# Patient Record
Sex: MALE | Race: BLACK OR AFRICAN AMERICAN | Employment: OTHER | URBAN - METROPOLITAN AREA
[De-identification: names, ages, dates, MRNs, and addresses within clinical notes are randomized per-mention and may not be internally consistent; named-entity substitution may affect disease eponyms.]

---

## 2017-01-19 ENCOUNTER — HOSPITAL ENCOUNTER (OUTPATIENT)
Dept: CT IMAGING | Age: 82
Discharge: HOME | End: 2017-01-19
Attending: INTERNAL MEDICINE
Payer: MEDICARE

## 2017-01-19 DIAGNOSIS — R93.89 ABNORMAL CT SCAN: ICD-10-CM

## 2017-01-19 PROCEDURE — 71250 CT THORAX DX C-: CPT

## 2017-04-25 ENCOUNTER — OFFICE VISIT (OUTPATIENT)
Dept: INTERNAL MEDICINE CLINIC | Age: 82
End: 2017-04-25

## 2017-04-25 ENCOUNTER — TELEPHONE (OUTPATIENT)
Dept: INTERNAL MEDICINE CLINIC | Age: 82
End: 2017-04-25

## 2017-04-25 ENCOUNTER — HOSPITAL ENCOUNTER (OUTPATIENT)
Dept: GENERAL RADIOLOGY | Age: 82
Discharge: HOME | End: 2017-04-25
Payer: MEDICARE

## 2017-04-25 VITALS
WEIGHT: 149.13 LBS | HEIGHT: 69 IN | TEMPERATURE: 98.1 F | OXYGEN SATURATION: 95 % | HEART RATE: 74 BPM | RESPIRATION RATE: 18 BRPM | BODY MASS INDEX: 22.09 KG/M2 | DIASTOLIC BLOOD PRESSURE: 75 MMHG | SYSTOLIC BLOOD PRESSURE: 160 MMHG

## 2017-04-25 DIAGNOSIS — Z13.39 SCREENING FOR ALCOHOLISM: ICD-10-CM

## 2017-04-25 DIAGNOSIS — R50.9 FEVER AND CHILLS: ICD-10-CM

## 2017-04-25 DIAGNOSIS — I45.2 BIFASCICULAR BLOCK: ICD-10-CM

## 2017-04-25 DIAGNOSIS — J20.9 ACUTE BRONCHITIS, UNSPECIFIED ORGANISM: ICD-10-CM

## 2017-04-25 DIAGNOSIS — I45.10 RBBB: ICD-10-CM

## 2017-04-25 DIAGNOSIS — Z00.00 MEDICARE ANNUAL WELLNESS VISIT, SUBSEQUENT: Primary | ICD-10-CM

## 2017-04-25 DIAGNOSIS — R42 DIZZY SPELLS: ICD-10-CM

## 2017-04-25 DIAGNOSIS — J41.8 MIXED SIMPLE AND MUCOPURULENT CHRONIC BRONCHITIS (HCC): ICD-10-CM

## 2017-04-25 DIAGNOSIS — R05.8 PRODUCTIVE COUGH: ICD-10-CM

## 2017-04-25 DIAGNOSIS — Z71.89 ADVANCED CARE PLANNING/COUNSELING DISCUSSION: ICD-10-CM

## 2017-04-25 DIAGNOSIS — J30.89 ALLERGIC RHINITIS DUE TO OTHER ALLERGIC TRIGGER, UNSPECIFIED RHINITIS SEASONALITY: ICD-10-CM

## 2017-04-25 DIAGNOSIS — N40.0 BENIGN PROSTATIC HYPERPLASIA, PRESENCE OF LOWER URINARY TRACT SYMPTOMS UNSPECIFIED, UNSPECIFIED MORPHOLOGY: ICD-10-CM

## 2017-04-25 DIAGNOSIS — Z13.31 SCREENING FOR DEPRESSION: ICD-10-CM

## 2017-04-25 PROCEDURE — 71020 XR CHEST PA LAT: CPT

## 2017-04-25 RX ORDER — FINASTERIDE 5 MG/1
5 TABLET, FILM COATED ORAL DAILY
Qty: 30 TAB | Refills: 5 | Status: SHIPPED | OUTPATIENT
Start: 2017-04-25 | End: 2017-10-15 | Stop reason: SDUPTHER

## 2017-04-25 RX ORDER — MOMETASONE FUROATE 50 UG/1
2 SPRAY, METERED NASAL DAILY
Qty: 1 CONTAINER | Refills: 5 | Status: SHIPPED | OUTPATIENT
Start: 2017-04-25

## 2017-04-25 RX ORDER — AMOXICILLIN AND CLAVULANATE POTASSIUM 875; 125 MG/1; MG/1
1 TABLET, FILM COATED ORAL 2 TIMES DAILY
Qty: 20 TAB | Refills: 0 | Status: SHIPPED | OUTPATIENT
Start: 2017-04-25 | End: 2017-05-17 | Stop reason: ALTCHOICE

## 2017-04-25 RX ORDER — IPRATROPIUM BROMIDE AND ALBUTEROL SULFATE 2.5; .5 MG/3ML; MG/3ML
3 SOLUTION RESPIRATORY (INHALATION)
COMMUNITY

## 2017-04-25 RX ORDER — ALBUTEROL SULFATE 0.83 MG/ML
2.5 SOLUTION RESPIRATORY (INHALATION)
Qty: 100 EACH | Refills: 5 | Status: SHIPPED | OUTPATIENT
Start: 2017-04-25 | End: 2017-10-23 | Stop reason: SDUPTHER

## 2017-04-25 RX ORDER — ALBUTEROL SULFATE 90 UG/1
AEROSOL, METERED RESPIRATORY (INHALATION)
Qty: 1 INHALER | Refills: 5 | Status: SHIPPED | OUTPATIENT
Start: 2017-04-25

## 2017-04-25 NOTE — MR AVS SNAPSHOT
Visit Information Date & Time Provider Department Dept. Phone Encounter #  
 4/25/2017  2:30 PM Graciela Cuevas MD [facility] [address] [id number] Follow-up Instructions Return in about 6 days (around 5/1/2017). Upcoming Health Maintenance Date Due DTaP/Tdap/Td series (1 - Tdap) 8/10/1958 ZOSTER VACCINE AGE 60> 8/10/1997 GLAUCOMA SCREENING Q2Y 8/10/2002 MEDICARE YEARLY EXAM 8/10/2002 Pneumococcal 65+ Low/Medium Risk (2 of 2 - PPSV23) 6/17/2018 Allergies as of 4/25/2017  Review Complete On: 4/25/2017 By: Jens Russo LPN Severity Noted Reaction Type Reactions Codeine  01/05/2013    Nausea and Vomiting Current Immunizations  Reviewed on 6/17/2015 Name Date Influenza High Dose Vaccine PF 10/1/2016 Pneumococcal Vaccine (Unspecified Type) 6/17/2013 Not reviewed this visit You Were Diagnosed With   
  
 Codes Comments Medicare annual wellness visit, subsequent    -  Primary ICD-10-CM: Z00.00 ICD-9-CM: V70.0 Mixed simple and mucopurulent chronic bronchitis (HCC)     ICD-10-CM: J41.8 ICD-9-CM: 491.1 Productive cough     ICD-10-CM: R05 ICD-9-CM: 786.2 Fever and chills     ICD-10-CM: R50.9 ICD-9-CM: 780.60 Dizzy spells     ICD-10-CM: K33 ICD-9-CM: 780.4 RBBB     ICD-10-CM: I45.10 ICD-9-CM: 426.4 Bifascicular block     ICD-10-CM: I45.2 ICD-9-CM: 426.53 Acute bronchitis, unspecified organism     ICD-10-CM: J20.9 ICD-9-CM: 466.0 Benign prostatic hyperplasia, presence of lower urinary tract symptoms unspecified, unspecified morphology     ICD-10-CM: N40.0 ICD-9-CM: 600.00 Vitals BP Pulse Temp Resp Height(growth percentile) Weight(growth percentile) 160/75 (BP 1 Location: Left arm, BP Patient Position: Sitting) 74 98.1 °F (36.7 °C) (Oral) 18 5' 9\" (1.753 m) 149 lb 2 oz (67.6 kg) SpO2 BMI Smoking Status 95% 22.02 kg/m2 Former Smoker Vitals History BMI and BSA Data Body Mass Index Body Surface Area 22.02 kg/m 2 1.81 m 2 Preferred Pharmacy Pharmacy Name Phone [id number] [address] , [address] [place] [address]  [phone] Your Updated Medication List  
  
   
This list is accurate as of: 4/25/17  4:39 PM.  Always use your most recent med list.  
  
  
  
  
 * albuterol 2.5 mg /3 mL (0.083 %) nebulizer solution Commonly known as:  PROVENTIL VENTOLIN  
3 mL by Nebulization route every four (4) hours as needed. Indications: Chronic Obstructive Pulmonary Disease * albuterol 90 mcg/actuation inhaler Commonly known as:  VENTOLIN HFA INHALE 2 PUFFS BY MOUTH EVERY 6 HOURS AS NEEDED FOR WHEEZING  
  
 albuterol-ipratropium 2.5 mg-0.5 mg/3 ml Nebu Commonly known as:  DUO-NEB  
3 mL by Nebulization route. alfuzosin SR 10 mg SR tablet Commonly known as:  Cassidy Pardon Take 10 mg by mouth daily. ALLERGY PO Take  by mouth. amoxicillin-clavulanate 875-125 mg per tablet Commonly known as:  AUGMENTIN Take 1 Tab by mouth two (2) times a day. DULERA 200-5 mcg/actuation HFA inhaler Generic drug:  mometasone-formoterol Take 2 Puffs by inhalation two (2) times a day. finasteride 5 mg tablet Commonly known as:  PROSCAR Take 1 Tab by mouth daily. guaiFENesin 1,200 mg Ta12 ER tablet Commonly known as:  [employer]hu [employer] Madhu Take  by mouth two (2) times a day. Iron 325 mg (65 mg iron) tablet Generic drug:  ferrous sulfate Take  by mouth Daily (before breakfast). mometasone 50 mcg/actuation nasal spray Commonly known as:  NASONEX  
2 Sprays by Both Nostrils route daily. predniSONE 5 mg tablet Commonly known as:  [place] Take 5 mg by mouth daily. PROTONIX 40 mg tablet Generic drug:  pantoprazole Take 40 mg by mouth daily. raNITIdine 300 mg tablet Commonly known as:  ZANTAC Take 300 mg by mouth two (2) times a day. TUDORZA PRESSAIR 400 mcg/actuation inhaler Generic drug:  aclidinium bromide Take 1 Puff by inhalation. * Notice: This list has 2 medication(s) that are the same as other medications prescribed for you. Read the directions carefully, and ask your doctor or other care provider to review them with you. Prescriptions Sent to Pharmacy Refills  
 amoxicillin-clavulanate (AUGMENTIN) 875-125 mg per tablet 0 Sig: Take 1 Tab by mouth two (2) times a day. Class: Normal  
 [id number] [address] Ph #: [phone] Route: Oral  
 finasteride (PROSCAR) 5 mg tablet 5 Sig: Take 1 Tab by mouth daily. Class: Normal  
 [id number] [address] Ph #: [phone] Route: Oral  
 mometasone (NASONEX) 50 mcg/actuation nasal spray 5 Si Sprays by Both Nostrils route daily. Class: Normal  
 [id number] [address] Ph #: [phone] Route: Both Nostrils  
 albuterol (PROVENTIL VENTOLIN) 2.5 mg /3 mL (0.083 %) nebulizer solution 5 Sig: 3 mL by Nebulization route every four (4) hours as needed. Indications: Chronic Obstructive Pulmonary Disease Class: Normal  
 [id number] [address] Ph #: [phone] Route: Nebulization  
 albuterol (VENTOLIN HFA) 90 mcg/actuation inhaler 5 Sig: INHALE 2 PUFFS BY MOUTH EVERY 6 HOURS AS NEEDED FOR WHEEZING Class: Normal  
 [id number] [address] Ph #: [phone] We Performed the Following AMB POC EKG ROUTINE W/ 12 LEADS, INTER & REP [14655 CPT(R)] CBC WITH AUTOMATED DIFF [87478 CPT(R)] METABOLIC PANEL, COMPREHENSIVE [76857 CPT(R)] Follow-up Instructions Return in about 6 days (around 2017). To-Do List   
 2017 Imaging:  XR CHEST PA LAT   
  
 2017 ECG: CARDIAC HOLTER MONITOR, 24 HOURS Introducing [facility]! Briana Pam introduces Str[employer] patient portal. Now you can access parts of your medical record, email your doctor's office, and request medication refills online. 1. In your internet browser, go to [email or web address] 2. Click on the First Time User? Click Here link in the Sign In box. You will see the New Member Sign Up page. 3. Enter your Strut Access Code exactly as it appears below. You will not need to use this code after youve completed the sign-up process. If you do not sign up before the expiration date, you must request a new code. · Strut Access Code: [email or web address] Expires: 7/24/2017  3:20 PM 
 
4. Enter the last four digits of your Social Security Number (xxxx) and Date of Birth (mm/dd/yyyy) as indicated and click Submit. You will be taken to the next sign-up page. 5. Create a Strut ID. This will be your Strut login ID and cannot be changed, so think of one that is secure and easy to remember. 6. Create a Strut password. You can change your password at any time. 7. Enter your Password Reset Question and Answer. This can be used at a later time if you forget your password. 8. Enter your e-mail address. You will receive e-mail notification when new information is available in [address] 9. Click Sign Up. You can now view and download portions of your medical record. 10. Click the Download Summary menu link to download a portable copy of your medical information. If you have questions, please visit the Frequently Asked Questions section of the Strut website. Remember, Strut is NOT to be used for urgent needs. For medical emergencies, dial 911. Now available from your iPhone and Android! Please provide this summary of care documentation to your next provider. Your primary care clinician is listed as Desirae Samaniego.  If you have any questions after today's visit, please call [phone].

## 2017-04-25 NOTE — PROGRESS NOTES
HISTORY OF PRESENT ILLNESS  Fabiano Haji is a 80 y.o. male. HPI  Upper respiratory illness:  Fabiano Haji presents with complaints of congestion, sore throat, night sweats, cough described as productive of brown sputum and hot and cold spells for 3 weeks. no nausea and no vomiting . he has not had  myalgias. Symptoms are moderate. Over-the-counter remedies including prednisone   has been used with poor relief of symptoms. Drinking plenty of fluids: no  Asthma?:  Yes - copd  former smoker, quit  years ago  Contacts with similar infections: no     He reports episodic lightheadedness without syncope. No palpitations or associated chest pain. Chronic dyspnea on exertion perhaps worse with above URI symptoms. No LE edema. Patient Active Problem List   Diagnosis Code    [facility] J[facility].9    BPH (benign prostatic hyperplasia) N40.0    History of lung abscess Z87.09    Bronchiectasis ([facility] J47.9     Past Medical History:   Diagnosis Date    Asthma     Chronic bronchitis (HCC)     Chronic obstructive pulmonary disease (HCC)     Diabetes (HCC)     no meds    GERD (gastroesophageal reflux disease)      Allergies   Allergen Reactions    Codeine Nausea and Vomiting     Current Outpatient Prescriptions on File Prior to Visit   Medication Sig Dispense Refill    predniSONE (DELTASONE) 5 mg tablet Take 5 mg by mouth daily.  guaiFENesin (MUCINEX) 1,200 mg Ta12 ER tablet Take  by mouth two (2) times a day.  0    albuterol (VENTOLIN HFA) 90 mcg/actuation inhaler INHALE 2 PUFFS BY MOUTH EVERY 6 HOURS AS NEEDED FOR WHEEZING 1 Inhaler 5    ferrous sulfate (IRON) 325 mg (65 mg iron) tablet Take  by mouth Daily (before breakfast).  ranitidine (ZANTAC) 300 mg tablet Take 300 mg by mouth two (2) times a day.  albuterol (PROVENTIL VENTOLIN) 2.5 mg /3 mL (0.083 %) nebulizer solution by Nebulization route as needed.       alfuzosin SR (UROXATRAL) 10 mg SR tablet Take 10 mg by mouth daily.  finasteride (PROSCAR) 5 mg tablet Take 5 mg by mouth daily.  NASONEX 50 mcg/actuation nasal spray 2 Sprays by Both Nostrils route daily.  mometasone-formoterol (DULERA) 200-5 mcg/actuation HFA inhaler Take 2 Puffs by inhalation two (2) times a day.  pantoprazole (PROTONIX) 40 mg tablet Take 40 mg by mouth daily.  aclidinium bromide (TUDORZA PRESSAIR) 400 mcg/actuation inhaler Take 1 Puff by inhalation. No current facility-administered medications on file prior to visit. Social History   Substance Use Topics    Smoking status: Former Smoker     Packs/day: 1.50     Years: 40.00     Quit date: 5/16/1979    Smokeless tobacco: None    Alcohol use No           ROS    Physical Exam   Constitutional: He appears well-developed and well-nourished. No distress. /75 (BP 1 Location: Left arm, BP Patient Position: Sitting)  Pulse 74  Temp 98.1 °F (36.7 °C) (Oral)   Resp 18  Ht 5' 9\" (1.753 m)  Wt 149 lb 2 oz (67.6 kg)  SpO2 95% Comment: 2 liters oxygen  BMI 22.02 kg/m2Body mass index is 22.02 kg/(m^2). HENT:   Nose: Mucosal edema and rhinorrhea present. Mouth/Throat: Oropharynx is clear and moist. No oropharyngeal exudate, posterior oropharyngeal edema or posterior oropharyngeal erythema. Neck: No JVD present. Carotid bruit is not present. Cardiovascular: Normal rate, regular rhythm, normal heart sounds and intact distal pulses. Pulmonary/Chest: Effort normal. No accessory muscle usage. No respiratory distress. He has decreased breath sounds. He has wheezes. He has no rhonchi. He has no rales. Musculoskeletal: He exhibits no edema. Neurological: He is alert. Skin: Skin is warm and dry. He is not diaphoretic. Nursing note and vitals reviewed. EKG: RBBB, PAC's noted, bifasicular block. O/w sinus.   EXAM: XR CHEST PA LAT.     INDICATION: Severe cough and fever with mixed simple and mucopurulent chronic  bronchitis.     COMPARISON: 1/5/2013.     FINDINGS:   PA and lateral radiographs of the chest were obtained. The patient is on nasal  oxygen. Lungs: There are surgical clips and staples in the right hilum with volume loss  of the right hemithorax and pleural and parenchymal scarring which is unchanged. The left lung is clear. Pleura: There is no pleural effusion or pneumothorax. Mediastinum: The cardiac and mediastinal contours and pulmonary vascularity are  normal.  Bones and soft tissues: The bones and soft tissues are within normal limits.     IMPRESSION  IMPRESSION: No significant change. ASSESSMENT and PLAN  Dona Edmondson was seen today for annual wellness visit. Diagnoses and all orders for this visit:    Medicare annual wellness visit, subsequent  [employer] received a complete medicare wellness assessment today by Hesham Eddy RN. I've reviewed her assessment note and all screening/preventative plans addressed. I also addressed all questions and concerns surrounding the assessment and plans with [employer]. Mixed simple and mucopurulent chronic bronchitis (HCC)  -     XR CHEST PA LAT; Future  -     albuterol (PROVENTIL VENTOLIN) 2.5 mg /3 mL (0.083 %) nebulizer solution; 3 mL by Nebulization route every four (4) hours as needed. Indications: Chronic Obstructive Pulmonary Disease  -     albuterol (VENTOLIN HFA) 90 mcg/actuation inhaler; INHALE 2 PUFFS BY MOUTH EVERY 6 HOURS AS NEEDED FOR WHEEZING    Productive cough  -     XR CHEST PA LAT; Future    Fever and chills  -     XR CHEST PA LAT; Future    Dizzy spells - BP elevated today. bifasicular block on EKG, ? Episodic complete block or other kenia arrhythmia? Check 24 hour holter. To ER if symptoms progress. Check basic labs  -     AMB POC EKG ROUTINE W/ 12 LEADS, INTER & REP  -     CBC WITH AUTOMATED DIFF  -     METABOLIC PANEL, COMPREHENSIVE  -     CARDIAC HOLTER MONITOR, 24 HOURS; Future  Close follow up.   Consider cardiology consultation based on results of above. RBBB  -     CARDIAC HOLTER MONITOR, 24 HOURS; Future    Bifascicular block  -     CARDIAC HOLTER MONITOR, 24 HOURS; Future    Acute bronchitis, unspecified organism -acite on chronic with negative chest X-ray  Will start antibx. Close follow up.  -     amoxicillin-clavulanate (AUGMENTIN) 875-125 mg per tablet; Take 1 Tab by mouth two (2) times a day. Benign prostatic hyperplasia, presence of lower urinary tract symptoms unspecified, unspecified morphology  -     finasteride (PROSCAR) 5 mg tablet; Take 1 Tab by mouth daily. Allergic rhinitis due to other allergic trigger, unspecified rhinitis seasonality  -     mometasone (NASONEX) 50 mcg/actuation nasal spray; 2 Sprays by Both Nostrils route daily. Other orders        Follow-up Disposition:  Return in about 6 days (around 5/1/2017).

## 2017-04-25 NOTE — TELEPHONE ENCOUNTER
Informed patient that his Cardiology appointment for a holter monitor placed on 4/27/2017 with him arriving at 9:30 am at [address] [facility] [address]. He was given to phone number if he needs to reschedule.

## 2017-04-26 LAB
ALBUMIN SERPL-MCNC: 4.5 G/DL (ref 3.5–4.8)
ALBUMIN/GLOB SERPL: 1.4 {RATIO} (ref 1.2–2.2)
ALP SERPL-CCNC: 75 IU/L (ref 39–117)
ALT SERPL-CCNC: 14 IU/L (ref 0–44)
AST SERPL-CCNC: 19 IU/L (ref 0–40)
BASOPHILS # BLD AUTO: 0 X10E3/UL (ref 0–0.2)
BASOPHILS NFR BLD AUTO: 0 %
BILIRUB SERPL-MCNC: 0.4 MG/DL (ref 0–1.2)
BUN SERPL-MCNC: 12 MG/DL (ref 8–27)
BUN/CREAT SERPL: 11 (ref 10–24)
CALCIUM SERPL-MCNC: 9.5 MG/DL (ref 8.6–10.2)
CHLORIDE SERPL-SCNC: 96 MMOL/L (ref 96–106)
CO2 SERPL-SCNC: 25 MMOL/L (ref 18–29)
CREAT SERPL-MCNC: 1.06 MG/DL (ref 0.76–1.27)
EOSINOPHIL # BLD AUTO: 0 X10E3/UL (ref 0–0.4)
EOSINOPHIL NFR BLD AUTO: 0 %
ERYTHROCYTE [DISTWIDTH] IN BLOOD BY AUTOMATED COUNT: 14 % (ref 12.3–15.4)
GLOBULIN SER CALC-MCNC: 3.3 G/DL (ref 1.5–4.5)
GLUCOSE SERPL-MCNC: 67 MG/DL (ref 65–99)
HCT VFR BLD AUTO: 36.3 % (ref 37.5–51)
HGB BLD-MCNC: 11.7 G/DL (ref 12.6–17.7)
IMM GRANULOCYTES # BLD: 0 X10E3/UL (ref 0–0.1)
IMM GRANULOCYTES NFR BLD: 0 %
LYMPHOCYTES # BLD AUTO: 0.6 X10E3/UL (ref 0.7–3.1)
LYMPHOCYTES NFR BLD AUTO: 9 %
MCH RBC QN AUTO: 28.5 PG (ref 26.6–33)
MCHC RBC AUTO-ENTMCNC: 32.2 G/DL (ref 31.5–35.7)
MCV RBC AUTO: 89 FL (ref 79–97)
MONOCYTES # BLD AUTO: 0.2 X10E3/UL (ref 0.1–0.9)
MONOCYTES NFR BLD AUTO: 3 %
NEUTROPHILS # BLD AUTO: 5.5 X10E3/UL (ref 1.4–7)
NEUTROPHILS NFR BLD AUTO: 88 %
PLATELET # BLD AUTO: 232 X10E3/UL (ref 150–379)
POTASSIUM SERPL-SCNC: 4.9 MMOL/L (ref 3.5–5.2)
PROT SERPL-MCNC: 7.8 G/DL (ref 6–8.5)
RBC # BLD AUTO: 4.1 X10E6/UL (ref 4.14–5.8)
SODIUM SERPL-SCNC: 142 MMOL/L (ref 134–144)
WBC # BLD AUTO: 6.3 X10E3/UL (ref 3.4–10.8)

## 2017-04-26 NOTE — PROGRESS NOTES
Nurse Navigator Medicare Wellness Visit performed by VIANEY Maldonado RN    This is a Subsequent LaurenRjei Visit providing Personalized Prevention Plan Services (PPPS) (Performed 12 months after initial AWV and PPPS )    I have reviewed the patient's medical history in detail and updated the computerized patient record. History     Past Medical History:   Diagnosis Date    Asthma     Chronic bronchitis (HCC)     Chronic obstructive pulmonary disease (HCC)     Diabetes ([address]     no meds    GERD (gastroesophageal reflux disease)       Past Surgical History:   Procedure Laterality Date    CHEST SURGERY PROCEDURE UNLISTED      R upper lobe resection? due to absess    HX CHOLECYSTECTOMY       Current Outpatient Prescriptions   Medication Sig Dispense Refill    albuterol-ipratropium (DUO-NEB) 2.5 mg-0.5 mg/3 ml nebu 3 mL by Nebulization route.  DIPHENHYDRAMINE HCL (ALLERGY PO) Take  by mouth.  amoxicillin-clavulanate (AUGMENTIN) 875-125 mg per tablet Take 1 Tab by mouth two (2) times a day. 20 Tab 0    guaiFENesin (MUCINEX) 1,200 mg Ta12 ER tablet Take  by mouth two (2) times a day.  0    finasteride (PROSCAR) 5 mg tablet Take 1 Tab by mouth daily. 30 Tab 5    mometasone (NASONEX) 50 mcg/actuation nasal spray 2 Sprays by Both Nostrils route daily. 1 Container 5    albuterol (PROVENTIL VENTOLIN) 2.5 mg /3 mL (0.083 %) nebulizer solution 3 mL by Nebulization route every four (4) hours as needed. Indications: Chronic Obstructive Pulmonary Disease 100 Each 5    albuterol (VENTOLIN HFA) 90 mcg/actuation inhaler INHALE 2 PUFFS BY MOUTH EVERY 6 HOURS AS NEEDED FOR WHEEZING 1 Inhaler 5    predniSONE (DELTASONE) 5 mg tablet Take 5 mg by mouth daily.  ferrous sulfate (IRON) 325 mg (65 mg iron) tablet Take  by mouth Daily (before breakfast).  ranitidine (ZANTAC) 300 mg tablet Take 300 mg by mouth two (2) times a day.       alfuzosin SR (UROXATRAL) 10 mg SR tablet Take 10 mg by mouth daily.      mometasone-formoterol (DULERA) 200-5 mcg/actuation HFA inhaler Take 2 Puffs by inhalation two (2) times a day.  pantoprazole (PROTONIX) 40 mg tablet Take 40 mg by mouth daily.  aclidinium bromide (TUDORZA PRESSAIR) 400 mcg/actuation inhaler Take 1 Puff by inhalation. Allergies   Allergen Reactions    Codeine Nausea and Vomiting     Family History   Problem Relation Age of Onset    Heart Disease Father      Social History   Substance Use Topics    Smoking status: Former Smoker     Packs/day: 1.50     Years: 40.00     Quit date: 5/16/1979    Smokeless tobacco: Not on file    Alcohol use No     Patient Active Problem List   Diagnosis Code    [facility] J44.9    BPH (benign prostatic hyperplasia) N40.0    History of lung abscess Z87.09    Bronchiectasis ([facility] J47.9    Advanced care planning/counseling discussion Z71.89       Depression Risk Factor Screening:   Patient denies feelings of being down, depressed or hopeless at this time. Patient states that they have a strong support system within their family & friends. PHQ 2 / 9, over the last two weeks 4/25/2017   Little interest or pleasure in doing things Not at all   Feeling down, depressed or hopeless Not at all   Total Score PHQ 2 0     Alcohol Risk Factor Screening: On any occasion during the past 3 months, have you had more than 4 drinks containing alcohol? No    Do you average more than 14 drinks per week? No      Functional Ability and Level of Safety:     Hearing Loss   normal-to-mild    Activities of Daily Living   Partial assistance. Patient states that he lives in a private residence with his brother. Patient states that he attempts to be independent in all ADL's, but he does not drive. Patient's sister & brother provide assistance & transportation for the patient as needed.  Patient is oxygen dependent due to COPD; patient wears continuous oxygen at 2L/min via nasal cannula. Patient has a portable oxygen tank & a home concentrator. Patient denies use of assistive devices for ambulation. Patient states that he enjoys walking for exercise, but when he is feeling sick, like he is now with a cough & cold, he becomes easily short of breath & dizzy when walking. Patient denies increasing oxygen level with exertion. Patient states that he will discuss this in more detail with PCP today. Patient states that he always ambulates slowly & cautiously. PCP notified. Requires assistance with:   ADL Assessment 4/25/2017   Feeding yourself No Help Needed   Getting from bed to chair No Help Needed   Getting dressed No Help Needed   Bathing or showering No Help Needed   Walk across the room (includes cane/walker) No Help Needed   Using the telphone No Help Needed   Taking your medications No Help Needed   Preparing meals No Help Needed   Managing money (expenses/bills) Help Needed   Moderately strenuous housework (laundry) Help Needed   Shopping for personal items (toiletries/medicines) Help Needed   Shopping for groceries Help Needed   Driving Help Needed   Climbing a flight of stairs No Help Needed   Getting to places beyond walking distances Help Needed       Fall Risk   Patient denies falls within the past year & verbalizes awareness of fall prevention strategies. Fall Risk Assessment, last 12 mths 4/25/2017   Able to walk? Yes   Fall in past 12 months? No     Abuse Screen   Patient is not abused    Review of Systems   Medicare Wellness Visit    Physical Examination     Evaluation of Cognitive Function:  Mood/affect:  Neutral & pleasant  Appearance: age appropriate and casually dressed  Family member/caregiver input: None present; however, patient reports a strong support system. No exam performed today, Medicare Wellness Visit.     Patient Care Team:  Cline Angelucci, MD as PCP - [facility])    Advice/Referrals/Counseling   Education and counseling provided:  End-of-Life planning (with patient's consent)  Pneumococcal Vaccine  Influenza Vaccine  Prostate cancer screening tests (PSA, covered annually)  Colorectal cancer screening tests  Screening for glaucoma  tdap & shingles vaccinations      Assessment/Plan   1. Patient states conversation about Advanced Medical Directive has been started, but nothing written down yet. NN encouraged patient to complete Advanced Medical Directive paperwork & bring a copy to the office for scanning into the medical record. Patient verbalized understanding & agreement. 2. Patient is up to date on the following immunizations: flu vaccine (admin 10/2016), tdap vaccine (admin 5/2013). Patient confirmed the aforementioned preventative immunization dates are correct. Patient reports receiving a pneumonia 23 vaccine several years ago, but he is unable to recall the administration dates. NN encouraged patient to check home records & if information obtained, to please notify PCP's office with the details. Patient verbalized agreement. Patient denies receiving a shingles vaccine in the past & patient denies ever having a case of shingles in the past. Patient is due for Prevnar 13. Patient is aware that Prevnar 13 can be given at their local pharmacy with a prescription from their PCP. Patient verbalized understanding. PCP notified. Patient requests to wait on the prevnar 13 vaccine until he feels better. Patient's health maintenance immunization record has been updated & is current. 3. Due to the patient's age, screening PSA's & screening colonoscopies are no longer indicated unless recommended by PCP or a specialist.    4. Patient was not wearing corrective lenses. Patient confirms that his last routine eye exam & glaucoma screening were completed 12/2015 performed by Dr. Shey Mills at the [facility]. A copy of the eye exam report is on file in the patient's medical record.  Patient verbalized awareness that he is due for an eye exam & he plans to make an eye appointment within the next few months. Patient verbalized understanding of all information discussed. Patient was given the opportunity to ask questions. Medication reconciliation completed by MA/ LPN and reviewed by PCP. Patient provided AVS which includes Medicare Wellness Preventative Screening Table.

## 2017-04-26 NOTE — PATIENT INSTRUCTIONS
Medicare Part B Preventive Services Guidelines/Limitations Date last completed and Frequency Due Date   Cardiovascular Screening Blood Tests (every 5 years)  Total cholesterol, HDL, Triglycerides Order as a panel if possible As recommended by your PCP or Specialist    As recommended by your PCP As recommended by your PCP or Specialist   Colorectal Cancer Screening  -Fecal occult blood test (annual)  -Flexible sigmoidoscopy (5y)  -Screening colonoscopy (10y)  -Barium Enema Age 49-80; After age [de-identified] if history of abnormal results As recommended by your PCP or Specialist     Recommended every 5 to 10 years  As recommended by your PCP or Specialist     Counseling to Prevent Tobacco Use (up to 8 sessions per year)  - Counseling greater than 3 and up to 10 minutes  - Counseling greater than 10 minutes Patients must be asymptomatic of tobacco-related conditions to receive as preventive service N/A N/A   Diabetes Screening Tests (at least every 3 years, Medicare covers annually or at 6-month intervals for prediabetic patients)    Fasting blood sugar (FBS) or glucose tolerance test (GTT) Patient must be diagnosed with one of the following:  -Hypertension, Dyslipidemia, obesity, previous impaired FBS or GTT  Or any two of the following: overweight, FH of diabetes, age ? 72, history of gestational diabetes, birth of baby weighing more than 9 pounds Completed 5/2015    Recommended every 3 years for non-diabetics    Recommended every 3-6 months for Pre-Diabetics and Diabetics As recommended by your PCP or Specialist     Glaucoma Screening (no USPSTF recommendation) Diabetes mellitus, family history, , age 48 or over,  American, age 72 or over Completed 12/2015    Recommended annually Due now   Prostate Cancer Screening (annually up to age 76)  - Digital rectal exam (ALEX)  - Prostate specific antigen (PSA) Annually (age 48 or over), ALEX not paid separately when covered E/M service is provided on same date As recommended by your PCP or Specialist    Recommended annually to age 76 As recommended by your PCP or Specialist     Seasonal Influenza Vaccination (annually)  Completed 10/2016    Recommended Annually Completed for 2016 flu season. TDAP Vaccination  Completed 5/2013    Recommended every 10 years As recommended by your PCP or Specialist   Zoster (Shingles) Vaccination Covered by Medicare Part D through the pharmacy- PCP provides prescription Never received    Recommended once over age 48  As recommended by your PCP or Specialist   Pneumococcal Vaccination (once after 72)  Pneumo 23-   Recommended once over the age of 72    Prevnar 15-  Recommended once over the age of 72 Completed several years ago      You are due for a Prevnar 13 vaccine. You can get this at your local pharmacy with a prescription from your PCP. If you think you have already received this vaccine, please notify our office of the administration date. Ultrasound Screening for Abdominal Aortic Aneurysm (AAA) (once) Patient must be referred through IPPE and not have had a screening for abdominal aortic aneurysm before under Medicare. Limited to patients who meet one of the following criteria:  - Men who are 73-68 years old and have smoked more than 100 cigarettes in their lifetime.  -Anyone with a FH of AAA  -Anyone recommended for screening by USPSTF Not indicated unless recommended by PCP   Not indicated unless recommended by PCP     Family Practice Management 2011    Please bring a copy of your completed advance medical directive to the office so it may be added to your medical record. Thank you. If you have any questions or concerns please feel free to contact me at [phone]. It was a pleasure meeting you today and participating in your healthcare.   Jenelle Mills RN

## 2017-04-27 ENCOUNTER — CLINICAL SUPPORT (OUTPATIENT)
Dept: CARDIOLOGY CLINIC | Age: 82
End: 2017-04-27

## 2017-04-27 DIAGNOSIS — R42 DIZZY SPELLS: ICD-10-CM

## 2017-04-27 DIAGNOSIS — I45.10 RBBB: ICD-10-CM

## 2017-04-27 DIAGNOSIS — I45.2 BIFASCICULAR BLOCK: ICD-10-CM

## 2017-04-27 NOTE — PROGRESS NOTES
Applied 24 hr Preventice holter per Dr Ruiz Vann dx: dizzy, RBBB, AV block. Pt has #[id number] & is due back on Fri 4/28/17.

## 2017-05-01 ENCOUNTER — OFFICE VISIT (OUTPATIENT)
Dept: INTERNAL MEDICINE CLINIC | Age: 82
End: 2017-05-01

## 2017-05-01 VITALS
DIASTOLIC BLOOD PRESSURE: 68 MMHG | TEMPERATURE: 98.2 F | OXYGEN SATURATION: 96 % | RESPIRATION RATE: 18 BRPM | SYSTOLIC BLOOD PRESSURE: 151 MMHG | WEIGHT: 145 LBS | HEART RATE: 69 BPM | BODY MASS INDEX: 21.48 KG/M2 | HEIGHT: 69 IN

## 2017-05-01 DIAGNOSIS — Z87.09 HISTORY OF BRONCHITIS: Primary | ICD-10-CM

## 2017-05-01 DIAGNOSIS — R42 DIZZINESS: ICD-10-CM

## 2017-05-01 NOTE — MR AVS SNAPSHOT
Visit Information Date & Time Provider Department Dept. Phone Encounter #  
 5/1/2017  4:00 PM Magnolia Zelaya MD [facility] [address] [id number] Follow-up Instructions Return in about 2 weeks (around 5/15/2017). Upcoming Health Maintenance Date Due DTaP/Tdap/Td series (1 - Tdap) 8/10/1958 ZOSTER VACCINE AGE 60> 8/10/1997 GLAUCOMA SCREENING Q2Y 8/10/2002 INFLUENZA AGE 9 TO ADULT 8/1/2017 MEDICARE YEARLY EXAM 4/26/2018 Pneumococcal 65+ Low/Medium Risk (2 of 2 - PPSV23) 6/17/2018 Allergies as of 5/1/2017  Review Complete On: 4/25/2017 By: Anupama Jesus LPN Severity Noted Reaction Type Reactions Codeine  01/05/2013    Nausea and Vomiting Current Immunizations  Reviewed on 6/17/2015 Name Date Influenza High Dose Vaccine PF 10/1/2016 Pneumococcal Vaccine (Unspecified Type) 6/17/2013 Not reviewed this visit You Were Diagnosed With   
  
 Codes Comments History of bronchitis    -  Primary ICD-10-CM: Z87.09 
ICD-9-CM: V12.69 Dizziness     ICD-10-CM: S18 ICD-9-CM: 780.4 Vitals BP Pulse Temp Resp Height(growth percentile) Weight(growth percentile) 151/68 (BP 1 Location: Left arm, BP Patient Position: Sitting) 69 98.2 °F (36.8 °C) (Oral) 18 5' 9\" (1.753 m) 145 lb (65.8 kg) SpO2 BMI Smoking Status 96% 21.41 kg/m2 Former Smoker Vitals History BMI and BSA Data Body Mass Index Body Surface Area  
 21.41 kg/m 2 1.79 m 2 Preferred Pharmacy Pharmacy Name Phone [id number] [phone] , [address] [place] [address]  [phone] Your Updated Medication List  
  
   
This list is accurate as of: 5/1/17  5:08 PM.  Always use your most recent med list.  
  
  
  
  
 * albuterol 2.5 mg /3 mL (0.083 %) nebulizer solution Commonly known as:  PROVENTIL VENTOLIN  
3 mL by Nebulization route every four (4) hours as needed.  Indications: Chronic Obstructive Pulmonary Disease * albuterol 90 mcg/actuation inhaler Commonly known as:  VENTOLIN HFA INHALE 2 PUFFS BY MOUTH EVERY 6 HOURS AS NEEDED FOR WHEEZING  
  
 albuterol-ipratropium 2.5 mg-0.5 mg/3 ml Nebu Commonly known as:  DUO-NEB  
3 mL by Nebulization route. alfuzosin SR 10 mg SR tablet Commonly known as:  Adalberto Rancho Take 10 mg by mouth daily. ALLERGY PO Take  by mouth. amoxicillin-clavulanate 875-125 mg per tablet Commonly known as:  AUGMENTIN Take 1 Tab by mouth two (2) times a day. DULERA 200-5 mcg/actuation HFA inhaler Generic drug:  mometasone-formoterol Take 2 Puffs by inhalation two (2) times a day. finasteride 5 mg tablet Commonly known as:  PROSCAR Take 1 Tab by mouth daily. guaiFENesin 1,200 mg Ta12 ER tablet Commonly known as:  Madhu & Madhu Take  by mouth two (2) times a day. Iron 325 mg (65 mg iron) tablet Generic drug:  ferrous sulfate Take  by mouth Daily (before breakfast). mometasone 50 mcg/actuation nasal spray Commonly known as:  NASONEX  
2 Sprays by Both Nostrils route daily. predniSONE 5 mg tablet Commonly known as:  Gene Yamileth Take 5 mg by mouth daily. PROTONIX 40 mg tablet Generic drug:  pantoprazole Take 40 mg by mouth daily. raNITIdine 300 mg tablet Commonly known as:  ZANTAC Take 300 mg by mouth two (2) times a day. TUDORZA PRESSAIR 400 mcg/actuation inhaler Generic drug:  aclidinium bromide Take 1 Puff by inhalation. * Notice: This list has 2 medication(s) that are the same as other medications prescribed for you. Read the directions carefully, and ask your doctor or other care provider to review them with you. Follow-up Instructions Return in about 2 weeks (around 5/15/2017). Introducing [facility] & [facility] SERVICES!    
 Saul Baum introduces [employer] patient portal. Now you can access parts of your medical record, email your doctor's office, and request medication refills online. 1. In your internet browser, go to [email or web address] 2. Click on the First Time User? Click Here link in the Sign In box. You will see the New Member Sign Up page. 3. Enter your [employer] Access Code exactly as it appears below. You will not need to use this code after youve completed the sign-up process. If you do not sign up before the expiration date, you must request a new code. · [employer] Access Code: [email or web address] Expires: 7/24/2017  3:20 PM 
 
4. Enter the last four digits of your Social Security Number (xxxx) and Date of Birth (mm/dd/yyyy) as indicated and click Submit. You will be taken to the next sign-up page. 5. Create a [employer] ID. This will be your [employer] login ID and cannot be changed, so think of one that is secure and easy to remember. 6. Create a [employer] password. You can change your password at any time. 7. Enter your Password Reset Question and Answer. This can be used at a later time if you forget your password. 8. Enter your e-mail address. You will receive e-mail notification when new information is available in [address] 9. Click Sign Up. You can now view and download portions of your medical record. 10. Click the Download Summary menu link to download a portable copy of your medical information. If you have questions, please visit the Frequently Asked Questions section of the [employer] website. Remember, [employer] is NOT to be used for urgent needs. For medical emergencies, dial 911. Now available from your iPhone and Android! Please provide this summary of care documentation to your next provider. Your primary care clinician is listed as Desmond Bennett. If you have any questions after today's visit, please call [phone].

## 2017-05-01 NOTE — PROGRESS NOTES
HISTORY OF PRESENT ILLNESS  Yoel Charlton is a 80 y.o. male. HPI  Problem follow up:  Yoel Charlton returns for follow up visit regarding acute on chronic bronchitis/ copd, dizziness. he was seen 6 days ago in office diagnosed with same and treated with augmentin. Workup was significant for negative chest X-ray for pneumonia. Notes, labs, studies, imaging related to this problem during prior visit were available . Since that visit, he has improved. he has been compliant with prescribed treatment. Residual symptoms include: cough is much better. Not much productive cough. Sweats sometimes. No fever/ chills. Still having some dizzy spells. No loss of consciousness. No chest pain. Recent holter monitor placed. Awaiting result  New issues associated with this problem include: none. ROS    Physical Exam   Constitutional: He appears well-developed and well-nourished. No distress. /68 (BP 1 Location: Left arm, BP Patient Position: Sitting)  Pulse 69  Temp 98.2 °F (36.8 °C) (Oral)   Resp 18  Ht 5' 9\" (1.753 m)  Wt 145 lb (65.8 kg)  SpO2 96% Comment: 2 liters oxygen  BMI 21.41 kg/m2Body mass index is 21.41 kg/(m^2). HENT:   Mouth/Throat: Oropharynx is clear and moist.   Neck: No JVD present. Carotid bruit is not present. Cardiovascular: Normal rate, regular rhythm, normal heart sounds and intact distal pulses. Pulmonary/Chest: Effort normal. No accessory muscle usage. No respiratory distress. He has decreased breath sounds. He has no wheezes. He has no rhonchi. He has no rales. Musculoskeletal: He exhibits no edema. Neurological: He is alert. Skin: Skin is warm and dry. He is not diaphoretic. Nursing note and vitals reviewed. ASSESSMENT and PLAN  Yan Moore was seen today for uri. Diagnoses and all orders for this visit:    History of bronchitis -improved back to near baseline. Complete antibx. Dizziness - episodic. No associated symptoms.   Awaiting holter result. Follow-up Disposition:  Return in about 2 weeks (around 5/15/2017).

## 2017-05-09 DIAGNOSIS — J41.8 MIXED SIMPLE AND MUCOPURULENT CHRONIC BRONCHITIS (HCC): ICD-10-CM

## 2017-05-09 RX ORDER — PREDNISONE 5 MG/1
TABLET ORAL
Qty: 30 TAB | Refills: 5 | Status: SHIPPED | OUTPATIENT
Start: 2017-05-09 | End: 2017-11-24 | Stop reason: SDUPTHER

## 2017-05-17 ENCOUNTER — OFFICE VISIT (OUTPATIENT)
Dept: INTERNAL MEDICINE CLINIC | Age: 82
End: 2017-05-17

## 2017-05-17 VITALS
HEIGHT: 69 IN | RESPIRATION RATE: 18 BRPM | DIASTOLIC BLOOD PRESSURE: 67 MMHG | SYSTOLIC BLOOD PRESSURE: 155 MMHG | WEIGHT: 149.13 LBS | OXYGEN SATURATION: 92 % | BODY MASS INDEX: 22.09 KG/M2 | TEMPERATURE: 98 F | HEART RATE: 76 BPM

## 2017-05-17 DIAGNOSIS — J42 CHRONIC BRONCHITIS, UNSPECIFIED CHRONIC BRONCHITIS TYPE (HCC): ICD-10-CM

## 2017-05-17 DIAGNOSIS — J47.9 BRONCHIECTASIS WITHOUT COMPLICATION (HCC): Primary | ICD-10-CM

## 2017-05-17 RX ORDER — DOXYCYCLINE 100 MG/1
TABLET ORAL
Refills: 0 | COMMUNITY
Start: 2017-05-11 | End: 2017-08-17 | Stop reason: ALTCHOICE

## 2017-05-17 NOTE — MR AVS SNAPSHOT
Visit Information Date & Time Provider Department Dept. Phone Encounter #  
 5/17/2017  8:45 AM Magnolia Zelaya MD [facility] [address] [id number] Follow-up Instructions Return in about 3 months (around 8/17/2017). Upcoming Health Maintenance Date Due DTaP/Tdap/Td series (1 - Tdap) 8/10/1958 ZOSTER VACCINE AGE 60> 8/10/1997 GLAUCOMA SCREENING Q2Y 8/10/2002 INFLUENZA AGE 9 TO ADULT 8/1/2017 MEDICARE YEARLY EXAM 4/26/2018 Pneumococcal 65+ Low/Medium Risk (2 of 2 - PPSV23) 6/17/2018 Allergies as of 5/17/2017  Review Complete On: 4/25/2017 By: Anupama Jesus LPN Severity Noted Reaction Type Reactions Codeine  01/05/2013    Nausea and Vomiting Current Immunizations  Reviewed on 6/17/2015 Name Date Influenza High Dose Vaccine PF 10/1/2016 Pneumococcal Vaccine (Unspecified Type) 6/17/2013 Not reviewed this visit You Were Diagnosed With   
  
 Codes Comments Bronchiectasis without complication ([facility]    -  Primary ICD-10-CM: J47.9 ICD-9-CM: 494.0 Chronic bronchitis, unspecified chronic bronchitis type ([facility]     ICD-10-CM: D36 ICD-9-CM: 491.9 Vitals BP Pulse Temp Resp Height(growth percentile) Weight(growth percentile) 155/67 (BP 1 Location: Left arm, BP Patient Position: Sitting) 76 98 °F (36.7 °C) (Oral) 18 5' 9\" (1.753 m) 149 lb 2 oz (67.6 kg) SpO2 BMI Smoking Status 92% 22.02 kg/m2 Former Smoker Vitals History BMI and BSA Data Body Mass Index Body Surface Area 22.02 kg/m 2 1.81 m 2 Preferred Pharmacy Pharmacy Name Phone [id number] [address], [address] [place] [address]  [phone] Your Updated Medication List  
  
   
This list is accurate as of: 5/17/17  9:26 AM.  Always use your most recent med list.  
  
  
  
  
 * albuterol 2.5 mg /3 mL (0.083 %) nebulizer solution Commonly known as:  PROVENTIL VENTOLIN  
 3 mL by Nebulization route every four (4) hours as needed. Indications: Chronic Obstructive Pulmonary Disease * albuterol 90 mcg/actuation inhaler Commonly known as:  VENTOLIN HFA INHALE 2 PUFFS BY MOUTH EVERY 6 HOURS AS NEEDED FOR WHEEZING  
  
 albuterol-ipratropium 2.5 mg-0.5 mg/3 ml Nebu Commonly known as:  DUO-NEB  
3 mL by Nebulization route. alfuzosin SR 10 mg SR tablet Commonly known as:  Thersia Silas Take 10 mg by mouth daily. ALLERGY PO Take  by mouth. doxycycline 100 mg tablet Commonly known as:  ADOXA  
TAKE 1 TABLET BY MOUTH EVERY DAY  
  
 DULERA 200-5 mcg/actuation HFA inhaler Generic drug:  mometasone-formoterol Take 2 Puffs by inhalation two (2) times a day. finasteride 5 mg tablet Commonly known as:  PROSCAR Take 1 Tab by mouth daily. guaiFENesin 1,200 mg Ta12 ER tablet Commonly known as:  Madhu & Madhu Take  by mouth two (2) times a day. Iron 325 mg (65 mg iron) tablet Generic drug:  ferrous sulfate Take  by mouth Daily (before breakfast). mometasone 50 mcg/actuation nasal spray Commonly known as:  NASONEX  
2 Sprays by Both Nostrils route daily. predniSONE 5 mg tablet Commonly known as:  DELTASONE  
TAKE 1 TABLET BY MOUTH EVERY DAY. TAKE WITH FOOD PROTONIX 40 mg tablet Generic drug:  pantoprazole Take 40 mg by mouth daily. raNITIdine 300 mg tablet Commonly known as:  ZANTAC Take 300 mg by mouth two (2) times a day. TUDORZA PRESSAIR 400 mcg/actuation inhaler Generic drug:  aclidinium bromide Take 1 Puff by inhalation. * Notice: This list has 2 medication(s) that are the same as other medications prescribed for you. Read the directions carefully, and ask your doctor or other care provider to review them with you. Follow-up Instructions Return in about 3 months (around 8/17/2017). Introducing [facility] & HEALTH SERVICES! Bella Keep introduces [employer] patient portal. Now you can access parts of your medical record, email your doctor's office, and request medication refills online. 1. In your internet browser, go to [email or web address] 2. Click on the First Time User? Click Here link in the Sign In box. You will see the New Member Sign Up page. 3. Enter your [employer] Access Code exactly as it appears below. You will not need to use this code after youve completed the sign-up process. If you do not sign up before the expiration date, you must request a new code. · [employer] Access Code: [email or web address] Expires: 7/24/2017  3:20 PM 
 
4. Enter the last four digits of your Social Security Number (xxxx) and Date of Birth (mm/dd/yyyy) as indicated and click Submit. You will be taken to the next sign-up page. 5. Create a Spot[employer] ID. This will be your Spotster login ID and cannot be changed, so think of one that is secure and easy to remember. 6. Create a [employer] password. You can change your password at any time. 7. Enter your Password Reset Question and Answer. This can be used at a later time if you forget your password. 8. Enter your e-mail address. You will receive e-mail notification when new information is available in [address] 9. Click Sign Up. You can now view and download portions of your medical record. 10. Click the Download Summary menu link to download a portable copy of your medical information. If you have questions, please visit the Frequently Asked Questions section of the [employer] website. Remember, [employer] is NOT to be used for urgent needs. For medical emergencies, dial 911. Now available from your iPhone and Android! Please provide this summary of care documentation to your next provider. Your primary care clinician is listed as Raciel Hanson. If you have any questions after today's visit, please call [phone].

## 2017-05-17 NOTE — PROGRESS NOTES
HISTORY OF PRESENT ILLNESS  Leela Bansal is a 80 y.o. male. HPI  Problem follow up:  Leela Bansal returns for follow up visit regarding dizziness, bronchitis. he was seen 2 weeks ago in office diagnosed with same and treated with antibix continued. Workup was significant for holter monitior - results reveiwed with pt - no dangerous arrhythmias, tachy or kenia. .  Notes, labs, studies, imaging related to this problem during prior visit were available . Since that visit, he has improved. he has been compliant with prescribed treatment. Residual symptoms include: dry cough. He does not report more dizziness  New issues associated with this problem include: Juan Brewer Now on doxycycline for prostatitis per urologist      ROS    Physical Exam   Constitutional: He appears well-developed and well-nourished. No distress. /67 (BP 1 Location: Left arm, BP Patient Position: Sitting)  Pulse 76  Temp 98 °F (36.7 °C) (Oral)   Resp 18  Ht 5' 9\" (1.753 m)  Wt 149 lb 2 oz (67.6 kg)  SpO2 92% Comment: 2 liters oxygen  BMI 22.02 kg/m2Body mass index is 22.02 kg/(m^2). HENT:   Mouth/Throat: Oropharynx is clear and moist.   Neck: No JVD present. Carotid bruit is not present. Cardiovascular: Normal rate, regular rhythm, normal heart sounds and intact distal pulses. Pulmonary/Chest: Effort normal. No accessory muscle usage. No respiratory distress. He has decreased breath sounds. He has no wheezes. He has no rhonchi. He has no rales. Musculoskeletal: He exhibits no edema or tenderness. Neurological: He is alert. Skin: Skin is warm and dry. He is not diaphoretic. Nursing note and vitals reviewed. ASSESSMENT and PLAN  Leslie Georges was seen today for cough. Diagnoses and all orders for this visit:    Bronchiectasis without complication ([address] - stable. Continue follow up with pulmonary. Inhaler regimen as reconciled. Call if any new symptoms. Apparently dizziness has mostly resolved.       Chronic bronchitis, unspecified chronic bronchitis type ([facility]      Follow-up Disposition:  Return in about 3 months (around 8/17/2017).

## 2017-06-03 ENCOUNTER — APPOINTMENT (OUTPATIENT)
Dept: GENERAL RADIOLOGY | Age: 82
End: 2017-06-03
Attending: NURSE PRACTITIONER
Payer: MEDICARE

## 2017-06-03 ENCOUNTER — HOSPITAL ENCOUNTER (EMERGENCY)
Age: 82
Discharge: HOME | End: 2017-06-03
Attending: EMERGENCY MEDICINE
Payer: MEDICARE

## 2017-06-03 VITALS
OXYGEN SATURATION: 100 % | RESPIRATION RATE: 16 BRPM | HEIGHT: 69 IN | DIASTOLIC BLOOD PRESSURE: 66 MMHG | TEMPERATURE: 98.9 F | BODY MASS INDEX: 22.07 KG/M2 | SYSTOLIC BLOOD PRESSURE: 142 MMHG | HEART RATE: 84 BPM | WEIGHT: 149 LBS

## 2017-06-03 DIAGNOSIS — R06.09 DOE (DYSPNEA ON EXERTION): Primary | ICD-10-CM

## 2017-06-03 PROCEDURE — 94640 AIRWAY INHALATION TREATMENT: CPT

## 2017-06-03 PROCEDURE — 74011000250 HC RX REV CODE- 250: Performed by: NURSE PRACTITIONER

## 2017-06-03 PROCEDURE — 99284 EMERGENCY DEPT VISIT MOD MDM: CPT

## 2017-06-03 PROCEDURE — 77030013140 HC MSK NEB VYRM -A

## 2017-06-03 PROCEDURE — 71020 XR CHEST PA LAT: CPT

## 2017-06-03 RX ADMIN — ALBUTEROL SULFATE 1 DOSE: 2.5 SOLUTION RESPIRATORY (INHALATION) at 12:23

## 2017-06-03 NOTE — ED TRIAGE NOTES
Pt assisted to treatment area via wheelchair with his own O2, he states that since the beginning of the year he has been having increased SOB and productive cough. He has been on several antibiotics most recently 2 weeks ago but he continues to be sick. He has had some fevers as well.

## 2017-06-03 NOTE — ED PROVIDER NOTES
HPI Comments: The patient is a 81-year-old male with a history of chronic bronchitis, COPD, and pulmonary fibrosis home O2-dependent on 2 L nasal cannula who presents to the emergency room with complaints of ongoing cough and dyspnea on exertion which is seemingly chronic. He is followed by his primary care and pulmonology. He is currently on doxycycline having completed a two-week course of Augmentin prior. He remains on prednisone. He utilizes duoneb and albuterol inhalers at home in addition to a nebulizer machine. Today he felt more winded when he was walking and therefore presented for evaluation. He reports intermittent subjective fevers. He states that he sleeps on two pillows was which has been chronic. Weight has been stable no history of heart failure. Pt denies chills, night sweats, chest pain, pressure, PND, orthopnea, abdominal pain, n/v/d, melena, hematuria, dysuria, constipation, HA, dizziness, and syncope. Past Medical History:  No date: Asthma  No date: Chronic bronchitis (HCC)  No date: Chronic obstructive pulmonary disease (HCC)  No date: Diabetes ([facility] [address]      Comment: no meds  No date: GERD (gastroesophageal reflux disease)    Past Surgical History:  No date: CHEST SURGERY PROCEDURE UNLISTED      Comment: R upper lobe resection? due to absess  No date: HX CHOLECYSTECTOMY    PCP:  Phil An MD          Patient is a 80 y.o. male presenting with shortness of breath and cough. The history is provided by the patient. Shortness of Breath   Associated symptoms include cough. Pertinent negatives include no fever, no headaches, no rhinorrhea, no sore throat, no ear pain, no neck pain, no wheezing, no chest pain, no vomiting, no abdominal pain, no rash and no leg swelling. Cough   Associated symptoms include shortness of breath.  Pertinent negatives include no chest pain, no chills, no eye redness, no ear pain, no headaches, no rhinorrhea, no sore throat, no myalgias, no wheezing, no nausea, no vomiting and no confusion. Past Medical History:   Diagnosis Date    Asthma     Chronic bronchitis (HCC)     Chronic obstructive pulmonary disease (HCC)     Diabetes ([facility] [address]     no meds    GERD (gastroesophageal reflux disease)        Past Surgical History:   Procedure Laterality Date    CHEST SURGERY PROCEDURE UNLISTED      R upper lobe resection? due to absess    HX CHOLECYSTECTOMY           Family History:   Problem Relation Age of Onset    Heart Disease Father        Social History     Social History    Marital status:      Spouse name: N/A    Number of children: N/A    Years of education: N/A     Occupational History    Not on file. Social History Main Topics    Smoking status: Former Smoker     Packs/day: 1.50     Years: 40.00     Quit date: 5/16/1979    Smokeless tobacco: Never Used    Alcohol use No    Drug use: No    Sexual activity: No     Other Topics Concern    Not on file     Social History Narrative         ALLERGIES: Codeine    Review of Systems   Constitutional: Negative for activity change, appetite change, chills, diaphoresis, fatigue, fever and unexpected weight change. HENT: Negative for congestion, ear pain, rhinorrhea, sinus pressure, sore throat, tinnitus, trouble swallowing and voice change. Eyes: Negative for pain, discharge, redness and visual disturbance. Respiratory: Positive for cough and shortness of breath. Negative for apnea, choking, chest tightness, wheezing and stridor. Chronic SOB, GRAHAM, and cough   Cardiovascular: Negative for chest pain, palpitations and leg swelling. Gastrointestinal: Negative for abdominal pain, constipation, nausea and vomiting. Endocrine: Negative for cold intolerance and heat intolerance. Genitourinary: Negative for difficulty urinating, dysuria, flank pain, hematuria, testicular pain and urgency.    Musculoskeletal: Negative for arthralgias, back pain, gait problem, joint swelling, myalgias, neck pain and neck stiffness. Skin: Negative for color change, pallor, rash and wound. Allergic/Immunologic: Negative for immunocompromised state. Neurological: Negative for dizziness, tremors, syncope, weakness, light-headedness, numbness and headaches. Hematological: Does not bruise/bleed easily. Psychiatric/Behavioral: Negative for agitation, confusion and suicidal ideas. Vitals:    06/03/17 1200 06/03/17 1211 06/03/17 1215 06/03/17 1230   BP: 139/61 145/62 152/55 142/66   Pulse:       Resp:       Temp:       SpO2: 100%  98% 100%   Weight:       Height:                Physical Exam   Constitutional: He is oriented to person, place, and time. He appears well-developed and well-nourished. No distress. HENT:   Head: Atraumatic. Nose: Nose normal.   Mouth/Throat: No oropharyngeal exudate. Eyes: Conjunctivae and EOM are normal. Right eye exhibits no discharge. Left eye exhibits no discharge. No scleral icterus. Neck: Normal range of motion. Neck supple. No JVD present. No tracheal deviation present. No thyromegaly present. Cardiovascular: Normal rate and regular rhythm. Exam reveals no gallop and no friction rub. No murmur heard. Pulmonary/Chest: No stridor. No respiratory distress. He has decreased breath sounds in the right lower field and the left lower field. He has wheezes in the right middle field and the left middle field. He has rhonchi in the right middle field. He has no rales. He exhibits no tenderness. Abdominal: Soft. Bowel sounds are normal. He exhibits no distension and no mass. There is no tenderness. There is no rebound and no guarding. Musculoskeletal: Normal range of motion. He exhibits no edema or tenderness. Lymphadenopathy:     He has no cervical adenopathy. Neurological: He is alert and oriented to person, place, and time. Coordination normal.   Skin: Skin is warm and dry. He is not diaphoretic. Psychiatric: He has a normal mood and affect.  His behavior is normal. Nursing note and vitals reviewed. MDM  Number of Diagnoses or Management Options  GRAHAM (dyspnea on exertion):   Diagnosis management comments:    * duoneb   * CXR       Amount and/or Complexity of Data Reviewed  Tests in the radiology section of CPT®: ordered and reviewed  Discuss the patient with other providers: yes    Risk of Complications, Morbidity, and/or Mortality  General comments:    - stable, ambulatory pt in NAD    Patient Progress  Patient progress: stable    ED Course       Procedures      1:03 PM  Pt has been reevaluated. There are no new complaints, changes, or physical findings at this time. Medications have been reviewed w/ pt and/or family. Pt and/or family's questions have been answered. Pt and/or family expressed good understanding of the dx/tx/rx and is in agreement with plan of care. Pt instructed and agreed to f/u w/ PCP and Pulmonologist and to return to ED upon further deterioration. Pt is ready for discharge. LABORATORY TESTS:  No results found for this or any previous visit (from the past 12 hour(s)). IMAGING RESULTS:  XR CHEST PA LAT   Final Result        Xr Chest Pa Lat    Result Date: 6/3/2017  INDICATION: . cough COMPARISON: Previous chest xray, 4/25/2017. CT of the chest, 1/19/2017. Canary Malady FINDINGS: PA and lateral view of the chest. . Lines/tubes/surgical: None. Heart/mediastinum: Unremarkable. Lungs/pleura: Chronic appearing changes without definite acute process. Suture material and surgical clips adjacent to the right hilum. No visualized pleural effusion or pneumothorax. Additional Comments: None. Canary Malady IMPRESSION: 1. No definite acute process. MEDICATIONS GIVEN:  Medications   albuterol 5mg / ipratropium 0.5mg neb solution (1 Dose Nebulization Given 6/3/17 1223)       IMPRESSION:  1. GRAHAM (dyspnea on exertion)        PLAN:  1. Current Discharge Medication List        2.    Follow-up Information     Follow up With Details Comments Contact Info    Emelia Nissen, MD In 2 days  [facility] [id number]  Internal Med [address]eda  [place] [address] [facility]  [phone]      Stormy Bowser MD In 3 days  [address] [facility] [address]  [phone]      [facility] EMERGENCY DEPT  As needed, If symptoms worsen [facility] [address] [place] [address]  [phone]        3.  Supportive care     Return to ED if worse     Pushpa Becerra NP  1:03 PM

## 2017-06-03 NOTE — DISCHARGE INSTRUCTIONS
Shortness of Breath: Care Instructions  Your Care Instructions  Shortness of breath has many causes. Sometimes conditions such as anxiety can lead to shortness of breath. Some people get mild shortness of breath when they exercise. Trouble breathing also can be a symptom of a serious problem, such as asthma, lung disease, emphysema, heart problems, and pneumonia. If your shortness of breath continues, you may need tests and treatment. Watch for any changes in your breathing and other symptoms. Follow-up care is a key part of your treatment and safety. Be sure to make and go to all appointments, and call your doctor if you are having problems. Its also a good idea to know your test results and keep a list of the medicines you take. How can you care for yourself at home? · Do not smoke or allow others to smoke around you. If you need help quitting, talk to your doctor about stop-smoking programs and medicines. These can increase your chances of quitting for good. · Get plenty of rest and sleep. · Take your medicines exactly as prescribed. Call your doctor if you think you are having a problem with your medicine. · Find healthy ways to deal with stress. ¨ Exercise daily. ¨ Get plenty of sleep. ¨ Eat regularly and well. When should you call for help? Call 911 anytime you think you may need emergency care. For example, call if:  · You have severe shortness of breath. · You have symptoms of a heart attack. These may include:  ¨ Chest pain or pressure, or a strange feeling in the chest.  ¨ Sweating. ¨ Shortness of breath. ¨ Nausea or vomiting. ¨ Pain, pressure, or a strange feeling in the back, neck, jaw, or upper belly or in one or both shoulders or arms. ¨ Lightheadedness or sudden weakness. ¨ A fast or irregular heartbeat. After you call 911, the  may tell you to chew 1 adult-strength or 2 to 4 low-dose aspirin. Wait for an ambulance. Do not try to drive yourself.   Call your doctor now or seek immediate medical care if:  · Your shortness of breath gets worse or you start to wheeze. Wheezing is a high-pitched sound when you breathe. · You wake up at night out of breath or have to prop your head up on several pillows to breathe. · You are short of breath after only light activity or while at rest.  Watch closely for changes in your health, and be sure to contact your doctor if:  · You do not get better over the next 1 to 2 days. Where can you learn more? Go to http://lashonda-patricia.info/. Enter S780 in the search box to learn more about \"Shortness of Breath: Care Instructions. \"  Current as of: May 23, 2016  Content Version: 11.2  © 8159-0115 [email or web address]. Care instructions adapted under license by [employer] (which disclaims liability or warranty for this information). If you have questions about a medical condition or this instruction, always ask your healthcare professional. [place] [address] any warranty or liability for your use of this information. We hope that we have addressed all of your medical concerns. The examination and treatment you received in the Emergency Department were for an emergent problem and were not intended as complete care. It is important that you follow up with your healthcare provider(s) for ongoing care. If your symptoms worsen or do not improve as expected, and you are unable to reach your usual health care provider(s), you should return to the Emergency Department. Today's healthcare is undergoing tremendous change, and patient satisfaction surveys are one of the many tools to assess the quality of medical care. You may receive a survey from the [employer] organization regarding your experience in the Emergency Department. I hope that your experience has been completely positive, particularly the medical care that I provided.   As such, please participate in the survey; anything less than excellent does not meet my expectations or intentions. [facility] and [facility] participate in nationally recognized quality of care measures. If your blood pressure is greater than 120/80, as reported below, we urge that you seek medical care to address the potential of high blood pressure, commonly known as hypertension. Hypertension can be hereditary or can be caused by certain medical conditions, pain, stress, or \"white coat syndrome. \"       Please make an appointment with your health care provider(s) for follow up of your Emergency Department visit. VITALS:   Patient Vitals for the past 8 hrs:   Temp Pulse Resp BP SpO2   06/03/17 1145 98.9 °F (37.2 °C) 84 16 139/64 100 %   06/03/17 1138 98.9 °F (37.2 °C) 85 28 150/67 100 %          Thank you for allowing us to provide you with medical care today. We realize that you have many choices for your emergency care needs. Please choose us in the future for any continued health care needs. Jordan Singh [facility], [address]: [phone]            No results found for this or any previous visit (from the past 24 hour(s)). Xr Chest Pa Lat    Result Date: 6/3/2017  INDICATION: . cough COMPARISON: Previous chest xray, 4/25/2017. CT of the chest, 1/19/2017. Niya Oliveira FINDINGS: PA and lateral view of the chest. . Lines/tubes/surgical: None. Heart/mediastinum: Unremarkable. Lungs/pleura: Chronic appearing changes without definite acute process. Suture material and surgical clips adjacent to the right hilum. No visualized pleural effusion or pneumothorax. Additional Comments: None. Niya Oliveira IMPRESSION: 1. No definite acute process.

## 2017-06-03 NOTE — ED NOTES
Pt and grandson given discharge instructions by Neo Gimenez NP he verbalizes an understanding pt stable at time of discharge assisted to lobby via wheelchair

## 2017-06-06 ENCOUNTER — PATIENT OUTREACH (OUTPATIENT)
Dept: INTERNAL MEDICINE CLINIC | Age: 82
End: 2017-06-06

## 2017-06-06 NOTE — PROGRESS NOTES
NNTOCED Call    Patient discharged on 5/3/17 from [facility]. Diagnosis: GRAHAM. Call placed on pt's H #, no answer. VM not set up. Call placed to pt's brother, not available, but will give message to pt to return this NN call. Contact info provided.

## 2017-08-17 ENCOUNTER — HOSPITAL ENCOUNTER (OUTPATIENT)
Dept: GENERAL RADIOLOGY | Age: 82
Discharge: HOME | End: 2017-08-17
Attending: INTERNAL MEDICINE
Payer: MEDICARE

## 2017-08-17 ENCOUNTER — OFFICE VISIT (OUTPATIENT)
Dept: INTERNAL MEDICINE CLINIC | Age: 82
End: 2017-08-17

## 2017-08-17 VITALS
OXYGEN SATURATION: 96 % | WEIGHT: 149 LBS | RESPIRATION RATE: 18 BRPM | BODY MASS INDEX: 22.07 KG/M2 | TEMPERATURE: 98.1 F | HEART RATE: 86 BPM | SYSTOLIC BLOOD PRESSURE: 145 MMHG | HEIGHT: 69 IN | DIASTOLIC BLOOD PRESSURE: 67 MMHG

## 2017-08-17 DIAGNOSIS — R07.89 CHEST WALL PAIN: ICD-10-CM

## 2017-08-17 DIAGNOSIS — R07.89 CHEST WALL PAIN: Primary | ICD-10-CM

## 2017-08-17 PROCEDURE — 71020 XR CHEST PA LAT: CPT

## 2017-08-17 NOTE — PROGRESS NOTES
HISTORY OF PRESENT ILLNESS  Dante Quispe is a [de-identified] y.o. male. HPI  Problem visit:  Dante Quispe is here for complaint of R sided chest wall pain. Problem began 1 week(s) ago. Severity is moderate - 7/10  Character of problem: sore pain,  Constant, no injuries  Occasionally heavy cough makes the problem worse. tylenol makes the problem better. Associated symptoms include: increased cough with whitish production. Some chronic nocturnal sweats unchanged. No fever/ chills. shortness of breath is the same. He was much sicker in July when he saw pulmonologist and placed on doxycycline then. Treatments tried include: Tylenol used and beneficial    Patient Active Problem List   Diagnosis Code    [facility] J44.9    BPH (benign prostatic hyperplasia) N40.0    History of lung abscess Z87.09    Bronchiectasis ([facility] J47.9    Advanced care planning/counseling discussion Z71.89     Past Medical History:   Diagnosis Date    Asthma     Chronic bronchitis (HCC)     [facility] obstructive pulmonary disease ([facility]     [facility] ([facility]     no meds    GERD (gastroesophageal reflux disease)      Allergies   Allergen Reactions    Codeine Nausea and Vomiting     Current Outpatient Prescriptions on File Prior to Visit   Medication Sig Dispense Refill    predniSONE (DELTASONE) 5 mg tablet TAKE 1 TABLET BY MOUTH EVERY DAY. TAKE WITH FOOD 30 Tab 5    albuterol-ipratropium (DUO-NEB) 2.5 mg-0.5 mg/3 ml nebu 3 mL by Nebulization route.  DIPHENHYDRAMINE HCL (ALLERGY PO) Take  by mouth.  guaiFENesin (MUCINEX) 1,200 mg Ta12 ER tablet Take  by mouth two (2) times a day.  0    finasteride (PROSCAR) 5 mg tablet Take 1 Tab by mouth daily. 30 Tab 5    mometasone (NASONEX) 50 mcg/actuation nasal spray 2 Sprays by Both Nostrils route daily.  1 Container 5    albuterol (PROVENTIL VENTOLIN) 2.5 mg /3 mL (0.083 %) nebulizer solution 3 mL by Nebulization route every four (4) hours as needed. Indications: Chronic Obstructive Pulmonary Disease 100 Each 5    albuterol (VENTOLIN HFA) 90 mcg/actuation inhaler INHALE 2 PUFFS BY MOUTH EVERY 6 HOURS AS NEEDED FOR WHEEZING 1 Inhaler 5    ferrous sulfate (IRON) 325 mg (65 mg iron) tablet Take  by mouth Daily (before breakfast).  ranitidine (ZANTAC) 300 mg tablet Take 300 mg by mouth two (2) times a day.  alfuzosin SR (UROXATRAL) 10 mg SR tablet Take 10 mg by mouth daily.  mometasone-formoterol (DULERA) 200-5 mcg/actuation HFA inhaler Take 2 Puffs by inhalation two (2) times a day.  aclidinium bromide (TUDORZA PRESSAIR) 400 mcg/actuation inhaler Take 1 Puff by inhalation.  pantoprazole (PROTONIX) 40 mg tablet Take 40 mg by mouth daily. No current facility-administered medications on file prior to visit. Social History   Substance Use Topics    Smoking status: Former Smoker     Packs/day: 1.50     Years: 40.00     Quit date: 5/16/1979    Smokeless tobacco: Never Used    Alcohol use No         ROS    Physical Exam   Constitutional: He appears well-developed and well-nourished. No distress. /67 (BP 1 Location: Left arm, BP Patient Position: Sitting)  Pulse 86  Temp 98.1 °F (36.7 °C) (Oral)   Resp 18  Ht 5' 9\" (1.753 m)  Wt 149 lb (67.6 kg)  SpO2 96% Comment: 2 liters oxygen  BMI 22 kg/m2Body mass index is 22 kg/(m^2). HENT:   Mouth/Throat: Oropharynx is clear and moist.   Neck: No JVD present. Carotid bruit is not present. Cardiovascular: Normal rate, regular rhythm and intact distal pulses. Murmur heard. Pulmonary/Chest: Effort normal. No accessory muscle usage. No respiratory distress. He has decreased breath sounds in the right middle field, the right lower field and the left lower field. He has no wheezes. He has no rhonchi. He has no rales. General tenderness where depicted. No swelling, deformities, rashes. Musculoskeletal: He exhibits no edema.    Neurological: He is alert.   Skin: Skin is warm and dry. He is not diaphoretic. Nursing note and vitals reviewed. ASSESSMENT and PLAN  Diagnoses and all orders for this visit:    1. Chest wall pain - R sided - clinically I don't think he has new pneumonia. ? Increase L sided ASDZ on xray does not correlate clinically. Can continue tylenol for chest wall / pleuritic pain on R. Call if worsening or new symptoms. Follow up with Dr. Driss Gonzalez 8/28 as planned. -     XR CHEST PA LAT; Future  Indication:  R flank, lateral chest wall pain, distant hx of lung abscess and  lobectomy on R.      Exam: PA and lateral views of the chest.     Direct comparison is made to prior CXR dated 6/3/2017.      Findings: Cardiomediastinal silhouette is stable. Right hilar surgical clips are  again noted. There is stable extensive multifocal right pleural parenchymal  scarring. There is mild left basilar patchy airspace disease which is increased  compared to prior chest x-ray dated Janis 3, 2017. There is no pleural fluid. There is no pneumothorax. IMPRESSION: Mild left basilar patchy airspace disease, increased.     Follow-up Disposition: Not on File

## 2017-08-17 NOTE — MR AVS SNAPSHOT
Visit Information Date & Time Provider Department Dept. Phone Encounter #  
 8/17/2017  9:00 AM Janette Gutierrez MD [facility] [address] [id number] Follow-up Instructions Return if symptoms worsen or fail to improve. Upcoming Health Maintenance Date Due DTaP/Tdap/Td series (1 - Tdap) 8/10/1958 ZOSTER VACCINE AGE 60> 6/10/1997 GLAUCOMA SCREENING Q2Y 8/10/2002 INFLUENZA AGE 9 TO ADULT 8/1/2017 MEDICARE YEARLY EXAM 4/26/2018 Pneumococcal 65+ Low/Medium Risk (2 of 2 - PPSV23) 6/17/2018 Allergies as of 8/17/2017  Review Complete On: 6/3/2017 By: Aileen Cassidy RN Severity Noted Reaction Type Reactions Codeine  01/05/2013    Nausea and Vomiting Current Immunizations  Reviewed on 6/17/2015 Name Date Influenza High Dose Vaccine PF 10/1/2016 Pneumococcal Vaccine (Unspecified Type) 6/17/2013 Not reviewed this visit You Were Diagnosed With   
  
 Codes Comments Chest wall pain    -  Primary ICD-10-CM: R07.89 ICD-9-CM: 786.52 Vitals BP Pulse Temp Resp Height(growth percentile) Weight(growth percentile) 145/67 (BP 1 Location: Left arm, BP Patient Position: Sitting) 86 98.1 °F (36.7 °C) (Oral) 18 5' 9\" (1.753 m) 149 lb (67.6 kg) SpO2 BMI Smoking Status 96% 22 kg/m2 Former Smoker Vitals History BMI and BSA Data Body Mass Index Body Surface Area  
 22 kg/m 2 1.81 m 2 Preferred Pharmacy Pharmacy Name Phone [id number] [address]  [phone] Your Updated Medication List  
  
   
This list is accurate as of: 8/17/17 10:38 AM.  Always use your most recent med list.  
  
  
  
  
 * albuterol 2.5 mg /3 mL (0.083 %) nebulizer solution Commonly known as:  PROVENTIL VENTOLIN  
3 mL by Nebulization route every four (4) hours as needed. Indications: Chronic Obstructive Pulmonary Disease * albuterol 90 mcg/actuation inhaler Commonly known as:  VENTOLIN HFA INHALE 2 PUFFS BY MOUTH EVERY 6 HOURS AS NEEDED FOR WHEEZING  
  
 albuterol-ipratropium 2.5 mg-0.5 mg/3 ml Nebu Commonly known as:  DUO-NEB  
3 mL by Nebulization route. alfuzosin SR 10 mg SR tablet Commonly known as:  Erica Gonzalez Take 10 mg by mouth daily. ALLERGY PO Take  by mouth. DULERA 200-5 mcg/actuation HFA inhaler Generic drug:  mometasone-formoterol Take 2 Puffs by inhalation two (2) times a day. finasteride 5 mg tablet Commonly known as:  PROSCAR Take 1 Tab by mouth daily. guaiFENesin 1,200 mg Ta12 ER tablet Commonly known as:  Madhu & Madhu Take  by mouth two (2) times a day. Iron 325 mg (65 mg iron) tablet Generic drug:  ferrous sulfate Take  by mouth Daily (before breakfast). mometasone 50 mcg/actuation nasal spray Commonly known as:  NASONEX  
2 Sprays by Both Nostrils route daily. predniSONE 5 mg tablet Commonly known as:  DELTASONE  
TAKE 1 TABLET BY MOUTH EVERY DAY. TAKE WITH FOOD PROTONIX 40 mg tablet Generic drug:  pantoprazole Take 40 mg by mouth daily. raNITIdine 300 mg tablet Commonly known as:  ZANTAC Take 300 mg by mouth two (2) times a day. TUDORZA PRESSAIR 400 mcg/actuation inhaler Generic drug:  aclidinium bromide Take 1 Puff by inhalation. * Notice: This list has 2 medication(s) that are the same as other medications prescribed for you. Read the directions carefully, and ask your doctor or other care provider to review them with you. Follow-up Instructions Return if symptoms worsen or fail to improve. To-Do List   
 08/17/2017 Imaging:  XR CHEST PA LAT Introducing [facility]! Shari Britton introduces [employer]orGator patient portal. Now you can access parts of your medical record, email your doctor's office, and request medication refills online.    
 
1. In your internet browser, go to [email or web address] 2. Click on the First Time User? Click Here link in the Sign In box. You will see the New Member Sign Up page. 3. Enter your Rewardpod Access Code exactly as it appears below. You will not need to use this code after youve completed the sign-up process. If you do not sign up before the expiration date, you must request a new code. · Rewardpod Access Code: [email or web address] Expires: 11/15/2017 10:37 AM 
 
4. Enter the last four digits of your Social Security Number (xxxx) and Date of Birth (mm/dd/yyyy) as indicated and click Submit. You will be taken to the next sign-up page. 5. Create a Tappitt ID. This will be your Rewardpod login ID and cannot be changed, so think of one that is secure and easy to remember. 6. Create a Rewardpod password. You can change your password at any time. 7. Enter your Password Reset Question and Answer. This can be used at a later time if you forget your password. 8. Enter your e-mail address. You will receive e-mail notification when new information is available in [address] 9. Click Sign Up. You can now view and download portions of your medical record. 10. Click the Download Summary menu link to download a portable copy of your medical information. If you have questions, please visit the Frequently Asked Questions section of the Rewardpod website. Remember, Rewardpod is NOT to be used for urgent needs. For medical emergencies, dial 911. Now available from your iPhone and Android! Please provide this summary of care documentation to your next provider. Your primary care clinician is listed as Julian Bob. If you have any questions after today's visit, please call [phone].

## 2017-10-20 ENCOUNTER — HOSPITAL ENCOUNTER (OUTPATIENT)
Dept: GENERAL RADIOLOGY | Age: 82
Discharge: HOME | End: 2017-10-20
Attending: NURSE PRACTITIONER
Payer: MEDICARE

## 2017-10-20 DIAGNOSIS — J44.1 OBSTRUCTIVE CHRONIC BRONCHITIS WITH EXACERBATION (HCC): ICD-10-CM

## 2017-10-20 PROCEDURE — 71020 XR CHEST PA LAT: CPT

## 2017-10-23 DIAGNOSIS — J41.8 MIXED SIMPLE AND MUCOPURULENT CHRONIC BRONCHITIS (HCC): ICD-10-CM

## 2017-10-23 RX ORDER — ALBUTEROL SULFATE 0.83 MG/ML
SOLUTION RESPIRATORY (INHALATION)
Qty: 100 EACH | Refills: 5 | Status: SHIPPED | OUTPATIENT
Start: 2017-10-23 | End: 2018-04-25 | Stop reason: SDUPTHER

## 2018-02-01 ENCOUNTER — OFFICE VISIT (OUTPATIENT)
Dept: INTERNAL MEDICINE CLINIC | Age: 83
End: 2018-02-01

## 2018-02-01 VITALS
RESPIRATION RATE: 18 BRPM | TEMPERATURE: 98.7 F | SYSTOLIC BLOOD PRESSURE: 150 MMHG | OXYGEN SATURATION: 100 % | DIASTOLIC BLOOD PRESSURE: 77 MMHG | HEART RATE: 89 BPM | WEIGHT: 152 LBS | BODY MASS INDEX: 22.51 KG/M2 | HEIGHT: 69 IN

## 2018-02-01 DIAGNOSIS — R39.12 BENIGN PROSTATIC HYPERPLASIA WITH WEAK URINARY STREAM: Chronic | ICD-10-CM

## 2018-02-01 DIAGNOSIS — H91.93 DECREASED HEARING OF BOTH EARS: ICD-10-CM

## 2018-02-01 DIAGNOSIS — N40.1 BENIGN PROSTATIC HYPERPLASIA WITH WEAK URINARY STREAM: Chronic | ICD-10-CM

## 2018-02-01 DIAGNOSIS — J42 CHRONIC BRONCHITIS, UNSPECIFIED CHRONIC BRONCHITIS TYPE (HCC): Primary | ICD-10-CM

## 2018-02-01 RX ORDER — GUAIFENESIN 600 MG/1
TABLET, EXTENDED RELEASE ORAL
Refills: 4 | COMMUNITY
Start: 2018-01-21

## 2018-02-01 RX ORDER — MONTELUKAST SODIUM 10 MG/1
TABLET ORAL
Refills: 3 | COMMUNITY
Start: 2018-01-18 | End: 2018-03-27 | Stop reason: SDUPTHER

## 2018-02-01 NOTE — PROGRESS NOTES
HISTORY OF PRESENT ILLNESS  Chioma Peña is a [de-identified] y.o. male. HPI  Severe COPD - seeing Dr. Kimberli Vela in pulmonary. No medication changes except he stopped azithro M,W,F due to stomach upset. Stomach is back to normal now. R chest wall pain has improved. Dyspnea with walking in home on cont O2. Occasional cough but dry. The patient denies fevers, chills  But occasional nocturnal sweats. Hx of BPH. Improved urine stream on medication. No dysuria, hematuria    Patient Active Problem List   Diagnosis Code    COPD (chronic obstructive pulmonary disease) ([facility] J44.9    BPH (benign prostatic hyperplasia) N40.0    History of lung abscess Z87.09    [facility] J47.9    Advanced care planning/counseling discussion Z71.89     Past Medical History:   Diagnosis Date    Asthma     Chronic bronchitis (HCC)     Chronic obstructive pulmonary disease (HCC)     Diabetes (HCC)     no meds    GERD (gastroesophageal reflux disease)      Allergies   Allergen Reactions    Codeine Nausea and Vomiting     Current Outpatient Prescriptions on File Prior to Visit   Medication Sig Dispense Refill    predniSONE (DELTASONE) 5 mg tablet TAKE 1 TABLET BY MOUTH EVERY DAY. TAKE WITH FOOD 30 Tab 5    albuterol (PROVENTIL VENTOLIN) 2.5 mg /3 mL (0.083 %) nebulizer solution INHALE CONTENTS OF 1 VIAL IN NEBULIZER EVERY 4 HOURS AS NEEDED 100 Each 5    finasteride (PROSCAR) 5 mg tablet TAKE 1 TABLET BY MOUTH EVERY DAY 30 Tab 5    albuterol-ipratropium (DUO-NEB) 2.5 mg-0.5 mg/3 ml nebu 3 mL by Nebulization route.  DIPHENHYDRAMINE HCL (ALLERGY PO) Take  by mouth.  mometasone (NASONEX) 50 mcg/actuation nasal spray 2 Sprays by Both Nostrils route daily. 1 Container 5    albuterol (VENTOLIN HFA) 90 mcg/actuation inhaler INHALE 2 PUFFS BY MOUTH EVERY 6 HOURS AS NEEDED FOR WHEEZING 1 Inhaler 5    ferrous sulfate (IRON) 325 mg (65 mg iron) tablet Take  by mouth Daily (before breakfast).       ranitidine (ZANTAC) 300 mg tablet Take 300 mg by mouth two (2) times a day.  alfuzosin SR (UROXATRAL) 10 mg SR tablet Take 10 mg by mouth daily.  mometasone-formoterol (DULERA) 200-5 mcg/actuation HFA inhaler Take 2 Puffs by inhalation two (2) times a day.  aclidinium bromide (TUDORZA PRESSAIR) 400 mcg/actuation inhaler Take 1 Puff by inhalation.  pantoprazole (PROTONIX) 40 mg tablet Take 40 mg by mouth daily. No current facility-administered medications on file prior to visit. Social History   Substance Use Topics    Smoking status: Former Smoker     Packs/day: 1.50     Years: 40.00     Quit date: 5/16/1979    Smokeless tobacco: Never Used    Alcohol use No         ROS    Physical Exam   Constitutional: He appears well-developed and well-nourished. No distress. /77 (BP 1 Location: Left arm, BP Patient Position: Sitting)  Pulse 89  Temp 98.7 °F (37.1 °C) (Oral)   Resp 18  Ht 5' 9\" (1.753 m)  Wt 152 lb (68.9 kg)  SpO2 100%  BMI 22.45 kg/m2Body mass index is 22.45 kg/(m^2). HENT:   Mouth/Throat: Oropharynx is clear and moist.   Neck: No JVD present. Carotid bruit is not present. Cardiovascular: Normal rate, regular rhythm and intact distal pulses. Murmur heard. Systolic murmur is present with a grade of 2/6   Pulses:       Posterior tibial pulses are 1+ on the right side, and 1+ on the left side. Pulmonary/Chest: Effort normal. No accessory muscle usage. No respiratory distress. He has decreased breath sounds. He has no wheezes. He has no rhonchi. He has no rales. Musculoskeletal: He exhibits no edema. Neurological: He is alert. Skin: Skin is warm and dry. He is not diaphoretic. Nursing note and vitals reviewed. ASSESSMENT and PLAN  Diagnoses and all orders for this visit:    1. Chronic bronchitis, unspecified chronic bronchitis type ([facility] -appears stable with no complications presently. Follow up with pulmonary.   May resume azithromycin and see if dyspepsia returns. -     aclidinium bromide (TUDORZA PRESSAIR) 400 mcg/actuation inhaler; Take 1 Puff by inhalation daily. -     mometasone-formoterol (DULERA) 200-5 mcg/actuation HFA inhaler; Take 2 Puffs by inhalation two (2) times a day. 2. Benign prostatic hyperplasia with weak urinary stream - fair improvement with medications. No change    3. Decreased hearing of both ears - he reports some hearing loss. -     REFERRAL TO ENT-OTOLARYNGOLOGY      Follow-up Disposition:  Return in about 6 months (around 8/1/2018).

## 2018-02-01 NOTE — MR AVS SNAPSHOT
[facility] 
 
 
 [address] [place] [address] Road 
[phone] Patient: Yun Chaves MRN: [id number] GWN:[id number] Visit Information Date & Time Provider Department Dept. Phone Encounter #  
 2/1/2018  8:15 AM Criss Gore MD [facility] [address] [id number] Follow-up Instructions Return in about 6 months (around 8/1/2018). Upcoming Health Maintenance Date Due DTaP/Tdap/Td series (1 - Tdap) 8/10/1958 ZOSTER VACCINE AGE 60> 6/10/1997 GLAUCOMA SCREENING Q2Y 8/10/2002 Influenza Age 5 to Adult 8/1/2017 MEDICARE YEARLY EXAM 4/26/2018 Pneumococcal 65+ Low/Medium Risk (2 of 2 - PPSV23) 6/17/2018 Allergies as of 2/1/2018  Review Complete On: 6/3/2017 By: Kait Ambrose RN Severity Noted Reaction Type Reactions Codeine  01/05/2013    Nausea and Vomiting Current Immunizations  Reviewed on 6/17/2015 Name Date Influenza High Dose Vaccine PF 10/1/2016 Pneumococcal Vaccine (Unspecified Type) 6/17/2013 Not reviewed this visit You Were Diagnosed With   
  
 Codes Comments Chronic bronchitis, unspecified chronic bronchitis type ([facility]    -  Primary ICD-10-CM: O45 ICD-9-CM: 491.9 Benign prostatic hyperplasia with weak urinary stream     ICD-10-CM: N40.1, R39.12 
ICD-9-CM: 600.01, 788.62 Decreased hearing of both ears     ICD-10-CM: H91.93 
ICD-9-CM: 389. 9 Vitals BP Pulse Temp Resp Height(growth percentile) Weight(growth percentile) 150/77 (BP 1 Location: Left arm, BP Patient Position: Sitting) 89 98.7 °F (37.1 °C) (Oral) 18 5' 9\" (1.753 m) 152 lb (68.9 kg) SpO2 BMI Smoking Status 100% 22.45 kg/m2 Former Smoker Vitals History BMI and BSA Data Body Mass Index Body Surface Area  
 22.45 kg/m 2 1.83 m 2 Preferred Pharmacy Pharmacy Name Phone [id number]- [address] [facility], [address] [place] [address]  [phone] Your Updated Medication List  
  
   
This list is accurate as of: 2/1/18 10:21 AM.  Always use your most recent med list.  
  
  
  
  
 aclidinium bromide 400 mcg/actuation inhaler Commonly known as:  Eda Sarah Take 1 Puff by inhalation daily. * albuterol 90 mcg/actuation inhaler Commonly known as:  VENTOLIN HFA INHALE 2 PUFFS BY MOUTH EVERY 6 HOURS AS NEEDED FOR WHEEZING  
  
 * albuterol 2.5 mg /3 mL (0.083 %) nebulizer solution Commonly known as:  PROVENTIL VENTOLIN  
INHALE CONTENTS OF 1 VIAL IN NEBULIZER EVERY 4 HOURS AS NEEDED  
  
 albuterol-ipratropium 2.5 mg-0.5 mg/3 ml Nebu Commonly known as:  DUO-NEB  
3 mL by Nebulization route. alfuzosin SR 10 mg SR tablet Commonly known as:  Tashia Plain Take 10 mg by mouth daily. ALLERGY PO Take  by mouth. finasteride 5 mg tablet Commonly known as:  PROSCAR  
TAKE 1 TABLET BY MOUTH EVERY DAY Iron 325 mg (65 mg iron) tablet Generic drug:  ferrous sulfate Take  by mouth Daily (before breakfast). mometasone 50 mcg/actuation nasal spray Commonly known as:  NASONEX  
2 Sprays by Both Nostrils route daily. mometasone-formoterol 200-5 mcg/actuation HFA inhaler Commonly known as:  Melany Loose Take 2 Puffs by inhalation two (2) times a day. montelukast 10 mg tablet Commonly known as:  SINGULAIR  
TAKE 1 TABLET BY MOUTH EVERY DAY  
  
 MUCINEX 600 mg ER tablet Generic drug:  guaiFENesin ER  
TAKE 1 TABLET BY MOUTH TWICE A DAY  
  
 predniSONE 5 mg tablet Commonly known as:  DELTASONE  
TAKE 1 TABLET BY MOUTH EVERY DAY. TAKE WITH FOOD PROTONIX 40 mg tablet Generic drug:  pantoprazole Take 40 mg by mouth daily. raNITIdine 300 mg tablet Commonly known as:  ZANTAC Take 300 mg by mouth two (2) times a day. * Notice:   This list has 2 medication(s) that are the same as other medications prescribed for you. Read the directions carefully, and ask your doctor or other care provider to review them with you. Prescriptions Sent to Pharmacy Refills  
 aclidinium bromide (TUDORZA PRESSAIR) 400 mcg/actuation inhaler 5 Sig: Take 1 Puff by inhalation daily. Class: Normal  
 Pharmacy: [facility] #[id number]- [address] [facility], [address] [place] [address] Dr Ph #: [phone] Route: Inhalation  
 mometasone-formoterol (DULERA) 200-5 mcg/actuation HFA inhaler 5 Sig: Take 2 Puffs by inhalation two (2) times a day. Class: Normal  
 Pharmacy: [facility]/pharm[facility] #[id number]- [address] [facility], [address] [place] [address] Dr Ph #: [phone] Route: Inhalation We Performed the Following REFERRAL TO ENT-OTOLARYNGOLOGY [TQI43 Custom] Follow-up Instructions Return in about 6 months (around 8/1/2018). Referral Information Referral ID Referred By Referred To  
  
 [id number] [facility] [address] [place] [address] [address] [place], [address] [facility] Visits Status Start Date End Date 1 New Request 2/1/18 2/1/19 If your referral has a status of pending review or denied, additional information will be sent to support the outcome of this decision. Introducing [facility]! Dafne Cabral introduces Populr patient portal. Now you can access parts of your medical record, email your doctor's office, and request medication refills online. 1. In your internet browser, go to [email or web address] 2. Click on the First Time User? Click Here link in the Sign In box. You will see the New Member Sign Up page. 3. Enter your Populr Access Code exactly as it appears below. You will not need to use this code after youve completed the sign-up process. If you do not sign up before the expiration date, you must request a new code. · Populr Access Code: [email or web address] Expires: 5/2/2018 10:21 AM 
 
 4. Enter the last four digits of your Social Security Number (xxxx) and Date of Birth (mm/dd/yyyy) as indicated and click Submit. You will be taken to the next sign-up page. 5. Create a [employer] ID. This will be your [employer] login ID and cannot be changed, so think of one that is secure and easy to remember. 6. Create a [employer] password. You can change your password at any time. 7. Enter your Password Reset Question and Answer. This can be used at a later time if you forget your password. 8. Enter your e-mail address. You will receive e-mail notification when new information is available in [address] 9. Click Sign Up. You can now view and download portions of your medical record. 10. Click the Download Summary menu link to download a portable copy of your medical information. If you have questions, please visit the Frequently Asked Questions section of the [employer] website. Remember, [employer] is NOT to be used for urgent needs. For medical emergencies, dial 911. Now available from your iPhone and Android! Please provide this summary of care documentation to your next provider. Your primary care clinician is listed as Lila Richard. If you have any questions after today's visit, please call [phone].

## 2018-03-23 ENCOUNTER — TELEPHONE (OUTPATIENT)
Dept: INTERNAL MEDICINE CLINIC | Age: 83
End: 2018-03-23

## 2018-03-23 NOTE — TELEPHONE ENCOUNTER
Please let pharmacy know his inhalers are prescribed by his pulmonalogist [facility]. Thanks.

## 2018-03-23 NOTE — TELEPHONE ENCOUNTER
Faxed notice per Dr. Darren Avila, inhalers are prescribed by his pulmonalogist [facility].

## 2018-04-25 DIAGNOSIS — J41.8 MIXED SIMPLE AND MUCOPURULENT CHRONIC BRONCHITIS (HCC): ICD-10-CM

## 2018-04-26 RX ORDER — ALBUTEROL SULFATE 0.83 MG/ML
SOLUTION RESPIRATORY (INHALATION)
Qty: 300 EACH | Refills: 5 | Status: SHIPPED | OUTPATIENT
Start: 2018-04-26 | End: 2018-11-23 | Stop reason: SDUPTHER

## 2018-05-06 RX ORDER — FINASTERIDE 5 MG/1
TABLET, FILM COATED ORAL
Qty: 30 TAB | Refills: 5 | Status: SHIPPED | OUTPATIENT
Start: 2018-05-06 | End: 2018-11-17 | Stop reason: SDUPTHER

## 2018-08-24 ENCOUNTER — OFFICE VISIT (OUTPATIENT)
Dept: INTERNAL MEDICINE CLINIC | Age: 83
End: 2018-08-24

## 2018-08-24 VITALS
BODY MASS INDEX: 21.55 KG/M2 | HEART RATE: 71 BPM | SYSTOLIC BLOOD PRESSURE: 163 MMHG | HEIGHT: 69 IN | DIASTOLIC BLOOD PRESSURE: 69 MMHG | WEIGHT: 145.5 LBS | TEMPERATURE: 98.2 F | OXYGEN SATURATION: 100 % | RESPIRATION RATE: 20 BRPM

## 2018-08-24 DIAGNOSIS — J42 CHRONIC BRONCHITIS, UNSPECIFIED CHRONIC BRONCHITIS TYPE (HCC): ICD-10-CM

## 2018-08-24 DIAGNOSIS — M79.644 FINGER PAIN, RIGHT: Primary | ICD-10-CM

## 2018-08-24 RX ORDER — PEAK FLOW METER
EACH MISCELLANEOUS
COMMUNITY
Start: 2018-07-04

## 2018-08-24 RX ORDER — AZITHROMYCIN 250 MG/1
TABLET, FILM COATED ORAL
COMMUNITY
Start: 2018-08-16 | End: 2021-08-19 | Stop reason: SDUPTHER

## 2018-08-24 NOTE — PROGRESS NOTES
HISTORY OF PRESENT ILLNESS  Jason Harvey is a 82 y.o. male. HPI  COPD Review:  The patient is being seen for follow up of COPD, not currently in exacerbation. Asthma symptoms occur: daily, infrequently. Wheezing when present is described as moderate and easily relieved with rescue bronchodilator. :Regimen compliance: The patient reports adherence to this regimen. History   Smoking Status    Former Smoker    Packs/day: 1.50    Years: 40.00    Quit date: 5/16/1979   Smokeless Tobacco    Never Used     he does use oxygen at home. On 2L. R index finger pain at fingernail medially. Occurred while washing dishes. ? Stuck something under nail. ROS    Physical Exam   Constitutional: He appears well-developed and well-nourished. No distress. /69 (BP 1 Location: Right arm, BP Patient Position: Sitting)  Pulse 71  Temp 98.2 °F (36.8 °C) (Oral)   Resp 20  Ht 5' 9\" (1.753 m)  Wt 145 lb 8 oz (66 kg)  SpO2 100% Comment: 2 liters oxygen  BMI 21.49 kg/m2Body mass index is 21.49 kg/(m^2). HENT:   Mouth/Throat: Oropharynx is clear and moist.   Neck: No JVD present. Carotid bruit is not present. Cardiovascular: Normal rate, regular rhythm and intact distal pulses. Murmur heard. Pulmonary/Chest: Effort normal. No accessory muscle usage. No respiratory distress. He has decreased breath sounds. He has no wheezes. He has no rhonchi. He has no rales. Musculoskeletal: He exhibits edema (trace in ankles). Hands:  Mild discoloration medial R index distal nail. Tender. No paronychia, redness, drainage or swelling. Neurological: He is alert. Skin: Skin is warm and dry. He is not diaphoretic. Nursing note and vitals reviewed. ASSESSMENT and PLAN  Diagnoses and all orders for this visit:    1. Finger pain, right - ? Mild trauma or FB to nail bed. No clear cellulitis/paronychia currently. Observe and If not improving or new symptoms occur, follow up.       2. Chronic bronchitis, unspecified chronic bronchitis type ([facility] - controlled and stable. his medications were reviewed and refilled where necessary as noted below. Labs ordered as noted. -     mometasone-formoterol (DULERA) 200-5 mcg/actuation HFA inhaler; Take 2 Puffs by inhalation two (2) times a day. Follow-up Disposition:  Return in about 6 months (around 2/24/2019).

## 2018-08-28 ENCOUNTER — TELEPHONE (OUTPATIENT)
Dept: INTERNAL MEDICINE CLINIC | Age: 83
End: 2018-08-28

## 2018-08-28 NOTE — TELEPHONE ENCOUNTER
Per [facility], prio authorization required for medication or an alternative. Prior authorization started via www.covermymeds. com for Dulera 200 mcg/5 mcg inhaler per 30 days. Patient has tried aclidinium bromide, ventolin, Proventil, Singulair and albuterol-ipratropium. All were therapeutic failure. Waiting for reply.

## 2018-08-28 NOTE — TELEPHONE ENCOUNTER
----- Message from Asha Moore sent at 8/28/2018  1:11 PM EDT -----  Regarding: Dr. Kylah Roldan ([address] [facility]) requesting a call back concerning rx Amanda Bliss.  Best contact [phone]

## 2018-09-04 RX ORDER — FLUTICASONE FUROATE AND VILANTEROL 200; 25 UG/1; UG/1
1 POWDER RESPIRATORY (INHALATION) DAILY
Qty: 1 INHALER | Refills: 5 | Status: SHIPPED | OUTPATIENT
Start: 2018-09-04 | End: 2021-07-14 | Stop reason: ALTCHOICE

## 2018-09-04 NOTE — TELEPHONE ENCOUNTER
Denial received from www.covermymeds. com [employer] for prior authorization for [facility]. The preferred alternative medication is Symbicort HFA, Breo ellipta, Advais Diskus or Advair HFA inhalers.

## 2018-09-04 NOTE — TELEPHONE ENCOUNTER
Change to [facility]. New medication or Refill was escribed to patient's pharmacy as requested.

## 2018-11-23 DIAGNOSIS — J41.8 MIXED SIMPLE AND MUCOPURULENT CHRONIC BRONCHITIS (HCC): ICD-10-CM

## 2018-11-25 RX ORDER — ALBUTEROL SULFATE 0.83 MG/ML
SOLUTION RESPIRATORY (INHALATION)
Qty: 100 EACH | Refills: 5 | Status: SHIPPED | OUTPATIENT
Start: 2018-11-25

## 2019-05-15 ENCOUNTER — HOSPITAL ENCOUNTER (OUTPATIENT)
Dept: CT IMAGING | Age: 84
Discharge: HOME | End: 2019-05-15
Attending: NURSE PRACTITIONER
Payer: MEDICARE

## 2019-05-15 DIAGNOSIS — R06.02 SOB (SHORTNESS OF BREATH): ICD-10-CM

## 2019-05-15 PROCEDURE — 71250 CT THORAX DX C-: CPT

## 2019-07-08 ENCOUNTER — OFFICE VISIT (OUTPATIENT)
Dept: CARDIOLOGY CLINIC | Age: 84
End: 2019-07-08

## 2019-07-08 VITALS
WEIGHT: 151.4 LBS | HEIGHT: 69 IN | BODY MASS INDEX: 22.42 KG/M2 | DIASTOLIC BLOOD PRESSURE: 68 MMHG | OXYGEN SATURATION: 100 % | HEART RATE: 75 BPM | RESPIRATION RATE: 18 BRPM | SYSTOLIC BLOOD PRESSURE: 118 MMHG

## 2019-07-08 DIAGNOSIS — R07.9 CHEST PAIN, UNSPECIFIED TYPE: ICD-10-CM

## 2019-07-08 DIAGNOSIS — R06.09 DOE (DYSPNEA ON EXERTION): Primary | ICD-10-CM

## 2019-07-08 DIAGNOSIS — I45.10 RBBB: ICD-10-CM

## 2019-07-08 DIAGNOSIS — J44.9 CHRONIC OBSTRUCTIVE PULMONARY DISEASE, UNSPECIFIED COPD TYPE (HCC): ICD-10-CM

## 2019-07-08 DIAGNOSIS — I25.10 CORONARY ARTERY CALCIFICATION SEEN ON CT SCAN: ICD-10-CM

## 2019-07-08 RX ORDER — TAMSULOSIN HYDROCHLORIDE 0.4 MG/1
0.4 CAPSULE ORAL DAILY
COMMUNITY

## 2019-07-08 RX ORDER — RANITIDINE 150 MG/1
150 TABLET, FILM COATED ORAL 2 TIMES DAILY
COMMUNITY
End: 2021-07-14 | Stop reason: ALTCHOICE

## 2019-07-08 RX ORDER — MINERAL OIL
ENEMA (ML) RECTAL DAILY
COMMUNITY
End: 2021-07-14 | Stop reason: ALTCHOICE

## 2019-07-08 NOTE — LETTER
7/8/19 Patient: Yudy Shah YOB: 1935 Date of Visit: 7/8/2019 Lilian Pierce MD 
[address] [place] [address] VIA Facsimile: [phone] Dear Lilian Pierce MD, Thank you for referring Mr. Candelaria Lee to [facility] for evaluation. My notes for this consultation are attached. If you have questions, please do not hesitate to call me. I look forward to following your patient along with you. Sincerely, Sherri Dubose MD

## 2019-07-08 NOTE — PROGRESS NOTES
Catherine Onofre MD [facility]  [address]  [place], [address] [facility]    Office [phone]  Fax [phone]  Cell [phone]       Julian Prieto is a 82 y.o. male referred for evaluation of SOB, dizziness, HTN, CAD on chest CT. Consult requested by Lord Melissa NP. Diagnoses  Encounter Diagnoses     ICD-10-CM ICD-9-CM   1. GRAHAM (dyspnea on exertion) R06.09 786.09   2. Chest pain, unspecified type R07.9 786.50   3. Chronic obstructive pulmonary disease, unspecified COPD type ([facility] J44.9 496   4. Coronary artery calcification seen on CT scan I25.10 414.00   5. RBBB I45.10 426.4       Assessment/Recommendations:    Incidental CAC by recent chest CT. He has chronic class 3 GRAHAM in the setting of COPD, remote smoking hx. His exam showed diffuse rhonchi. EKG with chronic RBBB with wide QRS. Will risk stratify with dobutamine stress echo. Start ASA 81mg daily    Phone follow up after reviewing tests        Subjective:    Julian Prieto has no previous cardiac hx. He has COPD and has had serial chest CTs most recently May 2019 demonstrating coronary artery calcifications, particularly involving the RCA (based on my review of scans). He also has diffuse descending aortic calcification. He has chronic GRAHAM, fairly limiting. He is on O2 for COPD. He quit smoking in 1978. He had started around the age of 15-12 with a greater than 30 pack year smoking    He lives with his brother. He spends his time watching TV and taking short walks. He does not drive. He reports poor sleep patterns. He reports intermittent swelling in his hands and ankles. He says his appetite is fair and he has occasional stomach pains. Patient denies any exertional chest pain, palpitations, syncope, orthopnea, edema or paroxysmal nocturnal dyspnea.     Cardiac risk factors   HTN no  DM  yes  Smoking no, former  Family hx of CAD yes, father w/ heart disease      Cardiac testing  No specialty comments available. Patient Active Problem List    Diagnosis    Advanced care planning/counseling discussion     Patient states conversation about Advanced Medical Directive has been started, but nothing written down yet. NN encouraged patient to complete Advanced Medical Directive paperwork & bring a copy to the office for scanning into the medical record. Patient verbalized understanding & agreement.  Bronchiectasis ([facility]    COPD (chronic obstructive pulmonary disease) ([facility])     Dr. Casa Ayoub in pulmonary      BPH (benign prostatic hyperplasia)     Dr. Verena Yan History of lung abscess       Past Medical History:   Diagnosis Date    Asthma     Chronic bronchitis ([facility])     Chronic obstructive pulmonary disease ([facility]     Diabetes ([facility]     no meds    GERD (gastroesophageal reflux disease)         Social History     Socioeconomic History    Marital status:      Spouse name: Not on file    Number of children: Not on file    Years of education: Not on file    Highest education level: Not on file   Tobacco Use    Smoking status: Former Smoker     Packs/day: 1.50     Years: 40.00     Pack years: 60.00     Last attempt to quit: 1979     Years since quittin.1    Smokeless tobacco: Never Used   Substance and Sexual Activity    Alcohol use: No    Drug use: No    Sexual activity: Never       Current Outpatient Medications   Medication Sig Dispense Refill    raNITIdine (ZANTAC) 150 mg tablet Take 150 mg by mouth two (2) times a day.  fexofenadine (ALLEGRA) 180 mg tablet Take  by mouth daily.  tamsulosin (FLOMAX) 0.4 mg capsule Take 0.4 mg by mouth daily.  fluticasone-umeclidinium-vilanterol (TRELEGY ELLIPTA) 100-62.5-25 mcg inhaler Take 1 Puff by inhalation daily.       Oxygen 2L continuous      finasteride (PROSCAR) 5 mg tablet TAKE 1 TABLET BY MOUTH EVERY DAY 90 Tab 0    albuterol (PROVENTIL VENTOLIN) 2.5 mg /3 mL (0.083 %) nebulizer solution USE 1 VIAL IN NEBULIZER EVERY 4 HOURS AS NEEDED 100 Each 5    azithromycin (ZITHROMAX) 250 mg tablet Take one tablet on Monday, Wednesday and Friday      montelukast (SINGULAIR) 10 mg tablet TAKE 1 TABLET BY MOUTH EVERY DAY 30 Tab 11    predniSONE (DELTASONE) 5 mg tablet TAKE 1 TABLET BY MOUTH EVERY DAY. TAKE WITH FOOD 30 Tab 5    albuterol-ipratropium (DUO-NEB) 2.5 mg-0.5 mg/3 ml nebu 3 mL by Nebulization route.  mometasone (NASONEX) 50 mcg/actuation nasal spray 2 Sprays by Both Nostrils route daily. 1 Container 5    fluticasone-vilanterol (BREO ELLIPTA) 200-25 mcg/dose inhaler Take 1 Puff by inhalation daily. 1 Inhaler 5    VIOS machine       MUCINEX 600 mg ER tablet TAKE 1 TABLET BY MOUTH TWICE A DAY  4    aclidinium bromide (TUDORZA PRESSAIR) 400 mcg/actuation inhaler Take 1 Puff by inhalation daily. 1 Inhaler 5    DIPHENHYDRAMINE HCL (ALLERGY PO) Take  by mouth.  albuterol (VENTOLIN HFA) 90 mcg/actuation inhaler INHALE 2 PUFFS BY MOUTH EVERY 6 HOURS AS NEEDED FOR WHEEZING 1 Inhaler 5    ferrous sulfate (IRON) 325 mg (65 mg iron) tablet Take  by mouth Daily (before breakfast).  alfuzosin SR (UROXATRAL) 10 mg SR tablet Take 10 mg by mouth daily.  pantoprazole (PROTONIX) 40 mg tablet Take 40 mg by mouth daily. Allergies   Allergen Reactions    Codeine Nausea and Vomiting          Review of Symptoms:  Constitutional: Negative for fever, chills, malaise/fatigue and diaphoresis. Respiratory: Negative for hemoptysis, sputum production, +cough, wheezing, dyspnea  Cardiovascular: Negative for chest pain, palpitations, orthopnea, claudication, leg swelling and PND. Gastrointestinal: Negative for heartburn, nausea, vomiting, blood in stool and melena. Genitourinary: Negative for dysuria and flank pain. Musculoskeletal: Negative for joint pain and back pain. Skin: Negative for rash. Neurological: Negative for focal weakness, seizures, loss of consciousness, weakness and headaches.   Endo/Heme/Allergies: Does not bruise/bleed easily. Psychiatric/Behavioral: Negative for memory loss. The patient does not have insomnia. Physical Exam  Visit Vitals  /68 (BP 1 Location: Left arm)   Pulse 75   Resp 18   Ht 5' 9\" (1.753 m)   Wt 151 lb 6.4 oz (68.7 kg)   SpO2 100%   BMI 22.36 kg/m²     Wt Readings from Last 3 Encounters:   07/08/19 151 lb 6.4 oz (68.7 kg)   08/24/18 145 lb 8 oz (66 kg)   02/01/18 152 lb (68.9 kg)     General - WDWN  HEENT: Eyes without jaundice, Hearing intact  Neck - JVP normal, thyroid nl  Cardiac - normal S1,S2, no murmurs, rubs or gallops. No clicks  Vascular - carotids without bruits, radials, femorals and pedal pulses equal bilateral  Lungs - clear to auscultation bilaterals, no rales ,wheezing +rhonchi  Abd - soft nontender, no HSM, no abd bruits  Extremities - no edema  Neuro - nonfocal, normal gait  Psych - mood and affect normal      Cardiographics  EKG 7/8/19 -  SR RBBB, significant IVCD (160 seconds), no significant change from 4/25/17        Written by Nelida Castillo, as dictated by Danielle Sánchez M.D.      Padmini Mccabe

## 2019-07-21 PROBLEM — I25.10 CORONARY ARTERY CALCIFICATION SEEN ON CT SCAN: Status: ACTIVE | Noted: 2019-07-21

## 2019-08-05 ENCOUNTER — TELEPHONE (OUTPATIENT)
Dept: CARDIOLOGY CLINIC | Age: 84
End: 2019-08-05

## 2019-08-05 NOTE — TELEPHONE ENCOUNTER
Cardiac testing  Dobutamine Stress Echo 7/26/19 - Normal study. Mr. Ronaldo Mayen was seen as a new patient by Dr. Vignesh Posada on 7/8/19. Referred by REGINA eVla for evaluation of SOB, HTN and CAD on chest CT. I have notified Mr. Ronaldo Mayen in that stress echo showed no concern for obstructive CAD, but reminded him that CT did not subclinical CAD. Advised that he continue ASA 81 mg daily. He will follow up with Pulmonary to continue to discuss ongoing GRAHAM.

## 2021-07-14 ENCOUNTER — HOSPITAL ENCOUNTER (OUTPATIENT)
Dept: GENERAL RADIOLOGY | Age: 86
Discharge: HOME | End: 2021-07-14
Attending: INTERNAL MEDICINE
Payer: MEDICARE

## 2021-07-14 ENCOUNTER — OFFICE VISIT (OUTPATIENT)
Dept: INTERNAL MEDICINE CLINIC | Age: 86
End: 2021-07-14
Payer: MEDICARE

## 2021-07-14 VITALS
RESPIRATION RATE: 14 BRPM | WEIGHT: 151.2 LBS | DIASTOLIC BLOOD PRESSURE: 68 MMHG | HEIGHT: 69 IN | BODY MASS INDEX: 22.39 KG/M2 | HEART RATE: 81 BPM | SYSTOLIC BLOOD PRESSURE: 110 MMHG | TEMPERATURE: 97.4 F | OXYGEN SATURATION: 100 %

## 2021-07-14 DIAGNOSIS — Z00.00 ENCOUNTER FOR MEDICAL EXAMINATION TO ESTABLISH CARE: Primary | ICD-10-CM

## 2021-07-14 DIAGNOSIS — J06.9 UPPER RESPIRATORY TRACT INFECTION, UNSPECIFIED TYPE: ICD-10-CM

## 2021-07-14 DIAGNOSIS — N40.1 BENIGN PROSTATIC HYPERPLASIA WITH WEAK URINARY STREAM: ICD-10-CM

## 2021-07-14 DIAGNOSIS — I10 ESSENTIAL HYPERTENSION: ICD-10-CM

## 2021-07-14 DIAGNOSIS — J42 CHRONIC BRONCHITIS, UNSPECIFIED CHRONIC BRONCHITIS TYPE (HCC): ICD-10-CM

## 2021-07-14 DIAGNOSIS — K21.9 GASTROESOPHAGEAL REFLUX DISEASE WITHOUT ESOPHAGITIS: ICD-10-CM

## 2021-07-14 DIAGNOSIS — H54.61 VISION LOSS OF RIGHT EYE: ICD-10-CM

## 2021-07-14 DIAGNOSIS — R39.12 BENIGN PROSTATIC HYPERPLASIA WITH WEAK URINARY STREAM: ICD-10-CM

## 2021-07-14 PROCEDURE — G8420 CALC BMI NORM PARAMETERS: HCPCS | Performed by: INTERNAL MEDICINE

## 2021-07-14 PROCEDURE — 1101F PT FALLS ASSESS-DOCD LE1/YR: CPT | Performed by: INTERNAL MEDICINE

## 2021-07-14 PROCEDURE — G8427 DOCREV CUR MEDS BY ELIG CLIN: HCPCS | Performed by: INTERNAL MEDICINE

## 2021-07-14 PROCEDURE — G8510 SCR DEP NEG, NO PLAN REQD: HCPCS | Performed by: INTERNAL MEDICINE

## 2021-07-14 PROCEDURE — 71046 X-RAY EXAM CHEST 2 VIEWS: CPT

## 2021-07-14 PROCEDURE — G8536 NO DOC ELDER MAL SCRN: HCPCS | Performed by: INTERNAL MEDICINE

## 2021-07-14 PROCEDURE — 99204 OFFICE O/P NEW MOD 45 MIN: CPT | Performed by: INTERNAL MEDICINE

## 2021-07-14 RX ORDER — DILTIAZEM HYDROCHLORIDE 30 MG/1
30 TABLET, FILM COATED ORAL
COMMUNITY

## 2021-07-14 RX ORDER — DOXYCYCLINE 100 MG/1
100 TABLET ORAL 2 TIMES DAILY
Qty: 14 TABLET | Refills: 0 | Status: SHIPPED | OUTPATIENT
Start: 2021-07-14 | End: 2021-07-21

## 2021-07-14 RX ORDER — FUROSEMIDE 20 MG/1
TABLET ORAL
COMMUNITY
Start: 2021-06-19

## 2021-07-14 RX ORDER — FAMOTIDINE 20 MG/1
TABLET, FILM COATED ORAL
COMMUNITY
Start: 2021-06-08 | End: 2021-08-19

## 2021-07-14 NOTE — PROGRESS NOTES
Please let him know that he has a pna where I heard the changes. Take the antibiotics like we discussed. Keep 7/28 appt with me. Let us know if he gets any worse.

## 2021-07-14 NOTE — PROGRESS NOTES
Mike Laird is a 82 y.o. male who presents today for Establish Care (RM21///pt presents today to establish care; )  . He has a history of   Patient Active Problem List   Diagnosis Code    COPD (chronic obstructive pulmonary disease) ([facility] J44.9    BPH (benign prostatic hyperplasia) N40.0    History of lung abscess Z87.09    [facility]chiectasis (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 Today patient is here to establish care. Previous PCP Dr. Grant Fernandes. he is switching because personal preference. Records are not available for me to review. I see his brother. COPD: Patient with a documented history of COPD. I do see pulmonary function tests from 2014 which does not show signs of obstruction but does show severely reduced forced vital capacity which is consistent with restrictive lung disease. Patient does follow with [facility]. He is on Trelegy. Also on nebulizers as well as montelukast.  Does take oxygen and prednisone at 5 mg. Taking 3x/week azithromycin. On 2L by NC. Reports that his breathing has been rough. Having more issues with breathing for about 3-4 days. More SOB. Not requiring more oxygen. BPH: Patient is on Proscar as well as tamsulosin. .  Reports that prostate symptoms have been Stable. GERD: on PPI and PRN h2 blocker. Hypertension- On CCB and Lasix. Denies any orthostatic symptoms. Hypertension ROS: taking medications as instructed, no medication side effects noted, no TIA's, no chest pain on exertion, no dyspnea on exertion, no swelling of ankles     reports that he quit smoking about 42 years ago. He has a 60.00 pack-year smoking history. He has never used smokeless tobacco.    reports no history of alcohol use.    BP Readings from Last 2 Encounters: 07/14/21 110/68   07/08/19 118/68     Social: Living at home with brother.  23 yrs. 7 children, 28 grandchildren. Many great grands. Independent of ADLs with exception of driving due to vision problems. Denies any smoking history. Family History: reviewed    ROS  Review of Systems   Constitutional: Negative for chills, fever and weight loss. HENT: Negative for congestion, ear discharge, ear pain, hearing loss, sore throat and tinnitus. Eyes: Positive for blurred vision. Negative for double vision and photophobia. Respiratory: Positive for sputum production and shortness of breath. Negative for cough, hemoptysis and wheezing. Cardiovascular: Negative for chest pain, palpitations, orthopnea and leg swelling. Gastrointestinal: Negative for abdominal pain, constipation, diarrhea, heartburn, nausea and vomiting. Genitourinary: Positive for frequency. Negative for dysuria and urgency. Musculoskeletal: Negative for joint pain and myalgias. Skin: Negative for rash. Neurological: Negative. Negative for headaches. Endo/Heme/Allergies: Does not bruise/bleed easily. Psychiatric/Behavioral: Negative for memory loss and suicidal ideas. Visit Vitals  /68 (BP 1 Location: Right arm, BP Patient Position: Sitting, BP Cuff Size: Adult)   Pulse 81   Temp 97.4 °F (36.3 °C) (Oral)   Resp 14   Ht 5' 9\" (1.753 m)   Wt 151 lb 3.2 oz (68.6 kg)   SpO2 100%   BMI 22.33 kg/m²       Physical Exam  Constitutional:       General: He is not in acute distress. Appearance: He is not diaphoretic. HENT:      Head: Normocephalic and atraumatic. Eyes:      Comments: Chronic changes to bilateral eyes   Cardiovascular:      Rate and Rhythm: Normal rate and regular rhythm. Heart sounds: No murmur heard. Pulmonary:      Effort: Pulmonary effort is normal. No respiratory distress. Breath sounds: Normal breath sounds. No wheezing.       Comments: Coarse and harsh breath sounds bilaterally. Worse to left lower lobe. Abdominal:      General: Abdomen is flat. There is no distension. Musculoskeletal:         General: Swelling (Mild bilateral pitting edema to ankles. ) present. Normal range of motion. Skin:     General: Skin is warm and dry. Neurological:      Mental Status: He is alert and oriented to person, place, and time. Psychiatric:         Mood and Affect: Affect normal.         Judgment: Judgment normal.         Current Outpatient Medications   Medication Sig    furosemide (LASIX) 20 mg tablet TAKE 1 TABLET BY MOUTH EVERY DAY    famotidine (PEPCID) 20 mg tablet TAKE 1 TABLET BY MOUTH TWICE A DAY    dilTIAZem IR (CARDIZEM) 30 mg tablet Take 30 mg by mouth every six (6) hours as needed.  tamsulosin (FLOMAX) 0.4 mg capsule Take 0.4 mg by mouth daily.  fluticasone-umeclidinium-vilanterol (TRELEGY ELLIPTA) 100-62.5-25 mcg inhaler Take 1 Puff by inhalation daily.  Oxygen 2L continuous    finasteride (PROSCAR) 5 mg tablet TAKE 1 TABLET BY MOUTH EVERY DAY    albuterol (PROVENTIL VENTOLIN) 2.5 mg /3 mL (0.083 %) nebulizer solution USE 1 VIAL IN NEBULIZER EVERY 4 HOURS AS NEEDED    azithromycin (ZITHROMAX) 250 mg tablet Take one tablet on Monday, Wednesday and Friday    VIOS machine     montelukast (SINGULAIR) 10 mg tablet TAKE 1 TABLET BY MOUTH EVERY DAY    MUCINEX 600 mg ER tablet TAKE 1 TABLET BY MOUTH TWICE A DAY    predniSONE (DELTASONE) 5 mg tablet TAKE 1 TABLET BY MOUTH EVERY DAY. TAKE WITH FOOD    albuterol-ipratropium (DUO-NEB) 2.5 mg-0.5 mg/3 ml nebu 3 mL by Nebulization route.  mometasone (NASONEX) 50 mcg/actuation nasal spray 2 Sprays by Both Nostrils route daily.  albuterol (VENTOLIN HFA) 90 mcg/actuation inhaler INHALE 2 PUFFS BY MOUTH EVERY 6 HOURS AS NEEDED FOR WHEEZING    pantoprazole (PROTONIX) 40 mg tablet Take 40 mg by mouth daily. No current facility-administered medications for this visit.         Past Medical History:   Diagnosis Date    Asthma     Chronic bronchitis ([facility]     Chronic obstructive pulmonary disease (HCC)     Diabetes ([facility]     no meds    GERD (gastroesophageal reflux disease)       Past Surgical History:   Procedure Laterality Date    HX CHOLECYSTECTOMY      WA CHEST SURGERY PROCEDURE UNLISTED      R upper lobe resection? due to absess      Social History     Tobacco Use    Smoking status: Former Smoker     Packs/day: 1.50     Years: 40.00     Pack years: 60.00     Quit date: 1979     Years since quittin.1    Smokeless tobacco: Never Used   Substance Use Topics    Alcohol use: No      Family History   Problem Relation Age of Onset    Heart Disease Father         Allergies   Allergen Reactions    Codeine Nausea and Vomiting        Assessment/Plan  Diagnoses and all orders for this visit:    1. Encounter for medical examination to establish care-we will get most recent PCP notes. 2. Benign prostatic hyperplasia with weak urinary stream-symptoms stable. 3. Chronic bronchitis, unspecified chronic bronchitis type ([facility]    4. Gastroesophageal reflux disease without esophagitis-on a PPI. 5. Vision loss of right eye-we will be seeing [facility] by the end of the week    6. Upper respiratory tract infection, unspecified type-acute on chronic shortness of breath. Given physical exam findings left lower lobe we will get a chest x-ray today and placed on doxycycline for 7 days. Patient to hold azithromycin while on this. Will not make any changes to prednisone for the time being. Saturations in the office are stable. We will see him back in 2 weeks. -     doxycycline (ADOXA) 100 mg tablet; Take 1 Tablet by mouth two (2) times a day for 7 days. -     XR CHEST PA LAT; Future    7. Essential hypertension-blood pressure stable. -     METABOLIC PANEL, COMPREHENSIVE; Future  -     CBC WITH AUTOMATED DIFF;  Future            Susannah Temple MD  2021    This note was created with the help of speech recognition software (Dragon) and may contain some 'sound alike' errors.

## 2021-07-14 NOTE — PROGRESS NOTES
Debby Holcomb  Identified pt with two pt identifiers(name and ). Chief Complaint   Patient presents with   Pedro Chavarria Establish Care     RM21///pt presents today to establish care;        1. Have you been to the ER, urgent care clinic since your last visit? Hospitalized since your last visit? NO    2. Have you seen or consulted any other health care providers outside of the [address] [place] [address] since your last visit? Include any pap smears or colon screening. NO      Provider notified of reason for visit, vitals and flowsheets obtained on patients.      Patient received paperwork for advance directive during previous visit but has not completed at this time     Reviewed record In preparation for visit, huddled with provider and have obtained necessary documentation      Health Maintenance Due   Topic    COVID-19 Vaccine (1)    DTaP/Tdap/Td series (1 - Tdap)    Shingrix Vaccine Age 49> (1 of 2)    Medicare Yearly Exam        Wt Readings from Last 3 Encounters:   21 151 lb 3.2 oz (68.6 kg)   19 152 lb (68.9 kg)   19 151 lb 6.4 oz (68.7 kg)     Temp Readings from Last 3 Encounters:   21 97.4 °F (36.3 °C) (Oral)   18 98.2 °F (36.8 °C) (Oral)   18 98.7 °F (37.1 °C) (Oral)     BP Readings from Last 3 Encounters:   21 110/68   19 118/68   18 163/69     Pulse Readings from Last 3 Encounters:   21 81   19 75   18 71     Vitals:    21 0919   BP: 110/68   Pulse: 81   Resp: 14   Temp: 97.4 °F (36.3 °C)   TempSrc: Oral   SpO2: 100%   Weight: 151 lb 3.2 oz (68.6 kg)   Height: 5' 9\" (1.753 m)   PainSc:   0 - No pain         Learning Assessment:  :     Learning Assessment 2016   PRIMARY LEARNER Patient   PRIMARY LANGUAGE ENGLISH       Depression Screening:  :     3 most recent PHQ Screens 2021   Little interest or pleasure in doing things Not at all   Feeling down, depressed, irritable, or hopeless Not at all   Total Score PHQ 2 0 Fall Risk Assessment:  :     Fall Risk Assessment, last 12 mths 7/14/2021   Able to walk? Yes   Fall in past 12 months? 0   Do you feel unsteady? 0   Are you worried about falling 0       Abuse Screening:  :     No flowsheet data found. ADL Screening:  :     ADL Assessment 4/25/2017   Feeding yourself No Help Needed   Getting from bed to chair No Help Needed   Getting dressed No Help Needed   Bathing or showering No Help Needed   Walk across the room (includes cane/walker) No Help Needed   Using the telphone No Help Needed   Taking your medications No Help Needed   Preparing meals No Help Needed   Managing money (expenses/bills) Help Needed   Moderately strenuous housework (laundry) Help Needed   Shopping for personal items (toiletries/medicines) Help Needed   Shopping for groceries Help Needed   Driving Help Needed   Climbing a flight of stairs No Help Needed   Getting to places beyond walking distances Help Needed         Medication reconciliation up to date and corrected with patient at this time.

## 2021-07-28 ENCOUNTER — HOSPITAL ENCOUNTER (OUTPATIENT)
Dept: GENERAL RADIOLOGY | Age: 86
Discharge: HOME | End: 2021-07-28
Attending: INTERNAL MEDICINE
Payer: MEDICARE

## 2021-07-28 ENCOUNTER — OFFICE VISIT (OUTPATIENT)
Dept: INTERNAL MEDICINE CLINIC | Age: 86
End: 2021-07-28
Payer: MEDICARE

## 2021-07-28 VITALS
TEMPERATURE: 98.7 F | RESPIRATION RATE: 16 BRPM | HEIGHT: 69 IN | WEIGHT: 148.6 LBS | SYSTOLIC BLOOD PRESSURE: 120 MMHG | HEART RATE: 76 BPM | OXYGEN SATURATION: 98 % | DIASTOLIC BLOOD PRESSURE: 62 MMHG | BODY MASS INDEX: 22.01 KG/M2

## 2021-07-28 DIAGNOSIS — I10 ESSENTIAL HYPERTENSION: ICD-10-CM

## 2021-07-28 DIAGNOSIS — R06.02 SOB (SHORTNESS OF BREATH) ON EXERTION: ICD-10-CM

## 2021-07-28 DIAGNOSIS — J18.9 PNEUMONIA OF LEFT LUNG DUE TO INFECTIOUS ORGANISM, UNSPECIFIED PART OF LUNG: Primary | ICD-10-CM

## 2021-07-28 DIAGNOSIS — J42 CHRONIC BRONCHITIS, UNSPECIFIED CHRONIC BRONCHITIS TYPE (HCC): ICD-10-CM

## 2021-07-28 DIAGNOSIS — J18.9 PNEUMONIA OF LEFT LUNG DUE TO INFECTIOUS ORGANISM, UNSPECIFIED PART OF LUNG: ICD-10-CM

## 2021-07-28 PROCEDURE — 1101F PT FALLS ASSESS-DOCD LE1/YR: CPT | Performed by: INTERNAL MEDICINE

## 2021-07-28 PROCEDURE — G8536 NO DOC ELDER MAL SCRN: HCPCS | Performed by: INTERNAL MEDICINE

## 2021-07-28 PROCEDURE — 71046 X-RAY EXAM CHEST 2 VIEWS: CPT

## 2021-07-28 PROCEDURE — 99214 OFFICE O/P EST MOD 30 MIN: CPT | Performed by: INTERNAL MEDICINE

## 2021-07-28 PROCEDURE — G8420 CALC BMI NORM PARAMETERS: HCPCS | Performed by: INTERNAL MEDICINE

## 2021-07-28 PROCEDURE — G8432 DEP SCR NOT DOC, RNG: HCPCS | Performed by: INTERNAL MEDICINE

## 2021-07-28 PROCEDURE — G8427 DOCREV CUR MEDS BY ELIG CLIN: HCPCS | Performed by: INTERNAL MEDICINE

## 2021-07-28 RX ORDER — PREDNISONE 20 MG/1
TABLET ORAL
COMMUNITY
Start: 2021-07-19 | End: 2021-08-19

## 2021-07-28 RX ORDER — AMOXICILLIN AND CLAVULANATE POTASSIUM 875; 125 MG/1; MG/1
TABLET, FILM COATED ORAL
COMMUNITY
Start: 2021-07-19 | End: 2021-08-19

## 2021-07-28 NOTE — PROGRESS NOTES
Cal Nathan is a 82 y.o. male who presents today for Follow-up (RM21///pt is presenting today to f/u shortness of breath and medication review; having issues with dizzines )  . 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 Today patient is here for follow-up. Pneumonia: At last visit patient was having increased breathing problems. X-ray did confirm Left lower lobe pneumonia. He was placed on doxycycline. Since he reports improved until this AM. Seen by a [facility], placed on Augmentin last week due to ? Leslie Ortiz Also re dosed on steroids. Feeling well until this AM. SOB with activity. Worse today. Patient with longstanding COPD. He is currently on oxygen as well as Trelegy. He also does take montelukast. He is followed by [facility]. He is on 3 times a week macrolide (being held due to other abx). Hypertension - on Lasix and diltiazem. Hypertension ROS: taking medications as instructed, no medication side effects noted, no TIA's, no chest pain on exertion, no dyspnea on exertion, no swelling of ankles     reports that he quit smoking about 42 years ago. He has a 60.00 pack-year smoking history. He has never used smokeless tobacco.    reports no history of alcohol use. BP Readings from Last 2 Encounters:   07/28/21 120/62   07/14/21 110/68       ROS  Review of Systems   Constitutional: Positive for chills, fever and malaise/fatigue. Negative for weight loss. HENT: Negative for congestion and sore throat.     Eyes: Negative for blurred vision, double vision and photophobia. Respiratory: Positive for shortness of breath. Negative for cough, hemoptysis, sputum production and wheezing. Cardiovascular: Negative for chest pain, palpitations and leg swelling. Gastrointestinal: Negative for abdominal pain, constipation, diarrhea, heartburn, nausea and vomiting. Genitourinary: Negative for dysuria, frequency and urgency. Musculoskeletal: Negative for joint pain and myalgias. Skin: Negative for rash. Neurological: Negative. Negative for headaches. Endo/Heme/Allergies: Does not bruise/bleed easily. Psychiatric/Behavioral: Negative for depression, hallucinations, memory loss, substance abuse and suicidal ideas. The patient is not nervous/anxious. Visit Vitals  /62 (BP 1 Location: Right arm, BP Patient Position: Sitting, BP Cuff Size: Adult)   Pulse 76   Temp 98.7 °F (37.1 °C) (Oral)   Resp 16   Ht 5' 9\" (1.753 m)   Wt 148 lb 9.6 oz (67.4 kg)   SpO2 98%   BMI 21.94 kg/m²       Physical Exam  Constitutional:       Appearance: He is well-developed. He is not diaphoretic. HENT:      Head: Normocephalic and atraumatic. Right Ear: Tympanic membrane normal.      Left Ear: Tympanic membrane normal.      Nose: Nose normal.   Eyes:      Comments: Chronic changes   Neck:      Vascular: No JVD. Cardiovascular:      Rate and Rhythm: Normal rate and regular rhythm. Heart sounds: No murmur heard. Pulmonary:      Effort: Pulmonary effort is normal. No respiratory distress. Breath sounds: Normal breath sounds. No wheezing. Comments: Coarse BS to LLL, but improved compared to 2 weeks ago. Abdominal:      General: Bowel sounds are normal. There is no distension. Palpations: Abdomen is soft. Tenderness: There is no abdominal tenderness. Musculoskeletal:         General: Normal range of motion. Cervical back: Normal range of motion and neck supple. Skin:     General: Skin is warm and dry.    Neurological:      Mental Status: He is alert and oriented to person, place, and time. Cranial Nerves: No cranial nerve deficit. Psychiatric:         Behavior: Behavior normal.           Current Outpatient Medications   Medication Sig    predniSONE (DELTASONE) 20 mg tablet TAKE 3 TABLETS BY MOUTH FOR 3 DAYS,2 TABLETS FOR 3 DAYS AND THEN 1 TABLET FOR 3 DAYS    amoxicillin-clavulanate (AUGMENTIN) 875-125 mg per tablet TAKE 1 TABLET BY MOUTH TWICE A DAY FOR 10 DAYS    furosemide (LASIX) 20 mg tablet TAKE 1 TABLET BY MOUTH EVERY DAY    famotidine (PEPCID) 20 mg tablet TAKE 1 TABLET BY MOUTH TWICE A DAY    dilTIAZem IR (CARDIZEM) 30 mg tablet Take 30 mg by mouth every six (6) hours as needed.  tamsulosin (FLOMAX) 0.4 mg capsule Take 0.4 mg by mouth daily.  fluticasone-umeclidinium-vilanterol (TRELEGY ELLIPTA) 100-62.5-25 mcg inhaler Take 1 Puff by inhalation daily.  Oxygen 2L continuous    finasteride (PROSCAR) 5 mg tablet TAKE 1 TABLET BY MOUTH EVERY DAY    albuterol (PROVENTIL VENTOLIN) 2.5 mg /3 mL (0.083 %) nebulizer solution USE 1 VIAL IN NEBULIZER EVERY 4 HOURS AS NEEDED    azithromycin (ZITHROMAX) 250 mg tablet Take one tablet on Monday, Wednesday and Friday    VIOS machine     montelukast (SINGULAIR) 10 mg tablet TAKE 1 TABLET BY MOUTH EVERY DAY    MUCINEX 600 mg ER tablet TAKE 1 TABLET BY MOUTH TWICE A DAY    albuterol-ipratropium (DUO-NEB) 2.5 mg-0.5 mg/3 ml nebu 3 mL by Nebulization route.  mometasone (NASONEX) 50 mcg/actuation nasal spray 2 Sprays by Both Nostrils route daily.  albuterol (VENTOLIN HFA) 90 mcg/actuation inhaler INHALE 2 PUFFS BY MOUTH EVERY 6 HOURS AS NEEDED FOR WHEEZING    pantoprazole (PROTONIX) 40 mg tablet Take 40 mg by mouth daily.  predniSONE (DELTASONE) 5 mg tablet TAKE 1 TABLET BY MOUTH EVERY DAY. TAKE WITH FOOD (Patient not taking: Reported on 7/28/2021)     No current facility-administered medications for this visit.         Past Medical History:   Diagnosis Date    Asthma     Chronic bronchitis (HCC)     Chronic obstructive pulmonary disease (HCC)     Diabetes (HCC)     no meds    GERD (gastroesophageal reflux disease)       Past Surgical History:   Procedure Laterality Date    HX CHOLECYSTECTOMY      SD CHEST SURGERY PROCEDURE UNLISTED      R upper lobe resection? due to absess      Social History     Tobacco Use    Smoking status: Former Smoker     Packs/day: 1.50     Years: 40.00     Pack years: 60.00     Quit date: 1979     Years since quittin.2    Smokeless tobacco: Never Used   Substance Use Topics    Alcohol use: No      Family History   Problem Relation Age of Onset    Heart Disease Father         Allergies   Allergen Reactions    Codeine Nausea and Vomiting        Assessment/Plan  Diagnoses and all orders for this visit:    1. Pneumonia of left lung due to infectious organism, unspecified part of lung-reports acute decline today. Oxygen saturations in office are stable. His breath sounds have improved on the left side. He is currently finishing Augmentin prescription. He is also on a prednisone taper. We will check a chest x-ray today as well as blood work. As long as imaging and blood work looks good lets see how he does over the next 2 to 3 weeks. He will let us know if things worsen. He will resume his macrolide once he is done with current antibiotic.  -     XR CHEST PA LAT; Future    2. SOB (shortness of breath) on exertion  -     XR CHEST PA LAT; Future    3. Chronic bronchitis, unspecified chronic bronchitis type ([address]    4. Essential hypertension-stable and blood pressure better than on initial visit. Irene Lozoya MD  2021    This note was created with the help of speech recognition software [employer]) and may contain some 'sound alike' errors.

## 2021-07-28 NOTE — PROGRESS NOTES
Please inform patient that there has been no new changes to his x-ray. Continue plan as we discussed.

## 2021-07-28 NOTE — PROGRESS NOTES
Bhupendra Benton  Identified pt with two pt identifiers(name and ). Chief Complaint   Patient presents with    Follow-up     RM21///pt is presenting today to f/u shortness of breath and medication review; having issues with dizzines        1. Have you been to the ER, urgent care clinic since your last visit? Hospitalized since your last visit? NO    2. Have you seen or consulted any other health care providers outside of the [address] [place] [address] since your last visit? Include any pap smears or colon screening. NO      Provider notified of reason for visit, vitals and flowsheets obtained on patients.      Patient received paperwork for advance directive during previous visit but has not completed at this time     Reviewed record In preparation for visit, huddled with provider and have obtained necessary documentation      Health Maintenance Due   Topic    COVID-19 Vaccine (1)    DTaP/Tdap/Td series (1 - Tdap)    Shingrix Vaccine Age 49> (1 of 2)    Medicare Yearly Exam        Wt Readings from Last 3 Encounters:   21 148 lb 9.6 oz (67.4 kg)   21 151 lb 3.2 oz (68.6 kg)   19 152 lb (68.9 kg)     Temp Readings from Last 3 Encounters:   21 98.7 °F (37.1 °C) (Oral)   21 97.4 °F (36.3 °C) (Oral)   18 98.2 °F (36.8 °C) (Oral)     BP Readings from Last 3 Encounters:   21 120/62   21 110/68   19 118/68     Pulse Readings from Last 3 Encounters:   21 76   21 81   19 75     Vitals:    21 1141   BP: 120/62   Pulse: 76   Resp: 16   Temp: 98.7 °F (37.1 °C)   TempSrc: Oral   SpO2: 98%   Weight: 148 lb 9.6 oz (67.4 kg)   Height: 5' 9\" (1.753 m)   PainSc:   0 - No pain         Learning Assessment:  :     Learning Assessment 2016   PRIMARY LEARNER Patient   PRIMARY LANGUAGE ENGLISH       Depression Screening:  :     3 most recent PHQ Screens 2021   Little interest or pleasure in doing things Not at all   Feeling down, depressed, irritable, or hopeless Not at all   Total Score PHQ 2 0       Fall Risk Assessment:  :     Fall Risk Assessment, last 12 mths 7/14/2021   Able to walk? Yes   Fall in past 12 months? 0   Do you feel unsteady? 0   Are you worried about falling 0       Abuse Screening:  :     No flowsheet data found. ADL Screening:  :     ADL Assessment 4/25/2017   Feeding yourself No Help Needed   Getting from bed to chair No Help Needed   Getting dressed No Help Needed   Bathing or showering No Help Needed   Walk across the room (includes cane/walker) No Help Needed   Using the telphone No Help Needed   Taking your medications No Help Needed   Preparing meals No Help Needed   Managing money (expenses/bills) Help Needed   Moderately strenuous housework (laundry) Help Needed   Shopping for personal items (toiletries/medicines) Help Needed   Shopping for groceries Help Needed   Driving Help Needed   Climbing a flight of stairs No Help Needed   Getting to places beyond walking distances Help Needed         Medication reconciliation up to date and corrected with patient at this time.

## 2021-07-29 LAB
ALBUMIN SERPL-MCNC: 3.4 G/DL (ref 3.5–5)
ALBUMIN/GLOB SERPL: 1.3 {RATIO} (ref 1.1–2.2)
ALP SERPL-CCNC: 62 U/L (ref 45–117)
ALT SERPL-CCNC: 30 U/L (ref 12–78)
ANION GAP SERPL CALC-SCNC: 6 MMOL/L (ref 5–15)
AST SERPL-CCNC: 14 U/L (ref 15–37)
BASOPHILS # BLD: 0 K/UL (ref 0–0.1)
BASOPHILS NFR BLD: 0 % (ref 0–1)
BILIRUB SERPL-MCNC: 0.7 MG/DL (ref 0.2–1)
BUN SERPL-MCNC: 16 MG/DL (ref 6–20)
BUN/CREAT SERPL: 12 (ref 12–20)
CALCIUM SERPL-MCNC: 9 MG/DL (ref 8.5–10.1)
CHLORIDE SERPL-SCNC: 102 MMOL/L (ref 97–108)
CO2 SERPL-SCNC: 34 MMOL/L (ref 21–32)
CREAT SERPL-MCNC: 1.29 MG/DL (ref 0.7–1.3)
DIFFERENTIAL METHOD BLD: ABNORMAL
EOSINOPHIL # BLD: 0 K/UL (ref 0–0.4)
EOSINOPHIL NFR BLD: 0 % (ref 0–7)
ERYTHROCYTE [DISTWIDTH] IN BLOOD BY AUTOMATED COUNT: 13.1 % (ref 11.5–14.5)
GLOBULIN SER CALC-MCNC: 2.6 G/DL (ref 2–4)
GLUCOSE SERPL-MCNC: 86 MG/DL (ref 65–100)
HCT VFR BLD AUTO: 36.9 % (ref 36.6–50.3)
HGB BLD-MCNC: 11.3 G/DL (ref 12.1–17)
IMM GRANULOCYTES # BLD AUTO: 0.2 K/UL (ref 0–0.04)
IMM GRANULOCYTES NFR BLD AUTO: 3 % (ref 0–0.5)
LYMPHOCYTES # BLD: 0.7 K/UL (ref 0.8–3.5)
LYMPHOCYTES NFR BLD: 9 % (ref 12–49)
MCH RBC QN AUTO: 29.2 PG (ref 26–34)
MCHC RBC AUTO-ENTMCNC: 30.6 G/DL (ref 30–36.5)
MCV RBC AUTO: 95.3 FL (ref 80–99)
MONOCYTES # BLD: 0.5 K/UL (ref 0–1)
MONOCYTES NFR BLD: 6 % (ref 5–13)
NEUTS SEG # BLD: 6.6 K/UL (ref 1.8–8)
NEUTS SEG NFR BLD: 82 % (ref 32–75)
NRBC # BLD: 0 K/UL (ref 0–0.01)
NRBC BLD-RTO: 0 PER 100 WBC
PLATELET # BLD AUTO: 152 K/UL (ref 150–400)
PMV BLD AUTO: 10.6 FL (ref 8.9–12.9)
POTASSIUM SERPL-SCNC: 3.9 MMOL/L (ref 3.5–5.1)
PROT SERPL-MCNC: 6 G/DL (ref 6.4–8.2)
RBC # BLD AUTO: 3.87 M/UL (ref 4.1–5.7)
RBC MORPH BLD: ABNORMAL
SODIUM SERPL-SCNC: 142 MMOL/L (ref 136–145)
WBC # BLD AUTO: 8 K/UL (ref 4.1–11.1)

## 2021-07-29 NOTE — PROGRESS NOTES
Please inform patient his blood work looks relatively normal and stable. He is to let us know if his shortness of breath does not improve.

## 2021-08-19 ENCOUNTER — OFFICE VISIT (OUTPATIENT)
Dept: INTERNAL MEDICINE CLINIC | Age: 86
End: 2021-08-19
Payer: MEDICARE

## 2021-08-19 VITALS
RESPIRATION RATE: 16 BRPM | OXYGEN SATURATION: 99 % | BODY MASS INDEX: 21.94 KG/M2 | DIASTOLIC BLOOD PRESSURE: 78 MMHG | SYSTOLIC BLOOD PRESSURE: 110 MMHG | HEIGHT: 69 IN | HEART RATE: 95 BPM | TEMPERATURE: 98.2 F

## 2021-08-19 DIAGNOSIS — I10 ESSENTIAL HYPERTENSION: Primary | ICD-10-CM

## 2021-08-19 DIAGNOSIS — J42 CHRONIC BRONCHITIS, UNSPECIFIED CHRONIC BRONCHITIS TYPE (HCC): ICD-10-CM

## 2021-08-19 DIAGNOSIS — K21.9 GASTROESOPHAGEAL REFLUX DISEASE WITHOUT ESOPHAGITIS: ICD-10-CM

## 2021-08-19 DIAGNOSIS — J18.9 PNEUMONIA OF LEFT LUNG DUE TO INFECTIOUS ORGANISM, UNSPECIFIED PART OF LUNG: ICD-10-CM

## 2021-08-19 PROCEDURE — G8432 DEP SCR NOT DOC, RNG: HCPCS | Performed by: INTERNAL MEDICINE

## 2021-08-19 PROCEDURE — 99214 OFFICE O/P EST MOD 30 MIN: CPT | Performed by: INTERNAL MEDICINE

## 2021-08-19 PROCEDURE — G8420 CALC BMI NORM PARAMETERS: HCPCS | Performed by: INTERNAL MEDICINE

## 2021-08-19 PROCEDURE — G8427 DOCREV CUR MEDS BY ELIG CLIN: HCPCS | Performed by: INTERNAL MEDICINE

## 2021-08-19 PROCEDURE — 1101F PT FALLS ASSESS-DOCD LE1/YR: CPT | Performed by: INTERNAL MEDICINE

## 2021-08-19 PROCEDURE — G8536 NO DOC ELDER MAL SCRN: HCPCS | Performed by: INTERNAL MEDICINE

## 2021-08-19 RX ORDER — AZITHROMYCIN 250 MG/1
TABLET, FILM COATED ORAL
Qty: 12 TABLET | Refills: 1 | Status: SHIPPED | OUTPATIENT
Start: 2021-08-19

## 2021-08-19 RX ORDER — PANTOPRAZOLE SODIUM 40 MG/1
40 TABLET, DELAYED RELEASE ORAL DAILY
Qty: 90 TABLET | Refills: 1 | Status: SHIPPED | OUTPATIENT
Start: 2021-08-19

## 2021-08-19 RX ORDER — FAMOTIDINE 20 MG/1
20 TABLET, FILM COATED ORAL
Qty: 180 TABLET | Refills: 3
Start: 2021-08-19

## 2021-08-19 RX ORDER — PREDNISONE 10 MG/1
TABLET ORAL
COMMUNITY
Start: 2021-08-10

## 2021-08-19 NOTE — PROGRESS NOTES
Ivone Jack is a 82 y.o. male who presents today for Follow-up (RM18//pt is presenting today following up for pneumonia; having alot of heartburn)  . 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 Today patient is here for follow-up. Pneumonia: At last visit patient was experiencing worsening symptoms. He had been changed to Augmentin for his antibiotics. He has since completed this antibiotic. He reports that his breathing has been getting a bit worse. Is on 3 times weekly azithromycin. Has also been bumped to 10 mg of prednisone daily. Does not have a follow-up with Dr. Mary Ann Hobson till next month. Has been experiencing more reflux and heartburn recently. Not taking pantoprazole every day. He is taking famotidine twice daily. Hypertension- BP stable. Hypertension ROS: taking medications as instructed, no medication side effects noted, no TIA's, no chest pain on exertion, no dyspnea on exertion, no swelling of ankles     reports that he quit smoking about 42 years ago. He has a 60.00 pack-year smoking history. He has never used smokeless tobacco.    reports no history of alcohol use. BP Readings from Last 2 Encounters:   08/19/21 110/78   07/28/21 120/62     GERD: has been a bit worse. Not on daily Pantoprazole. Taking famotidine. ROS  Review of Systems   Constitutional: Negative for chills, fever and weight loss. HENT: Negative for congestion and sore throat. Eyes: Positive for blurred vision. Negative for double vision and photophobia.    Respiratory: Positive for shortness of breath. Negative for cough, hemoptysis, sputum production and wheezing. Cardiovascular: Negative for chest pain, palpitations, orthopnea, claudication and leg swelling. Gastrointestinal: Positive for heartburn. Negative for abdominal pain, constipation, diarrhea, nausea and vomiting. Genitourinary: Negative for dysuria, frequency and urgency. Musculoskeletal: Negative for joint pain and myalgias. Skin: Negative for rash. Neurological: Negative. Negative for headaches. Endo/Heme/Allergies: Does not bruise/bleed easily. Psychiatric/Behavioral: Negative for depression, memory loss and suicidal ideas. Visit Vitals  /78 (BP 1 Location: Left upper arm, BP Patient Position: Sitting, BP Cuff Size: Adult)   Pulse 95   Temp 98.2 °F (36.8 °C) (Oral)   Resp 16   Ht 5' 9\" (1.753 m)   SpO2 99%   BMI 21.94 kg/m²       Physical Exam  Constitutional:       Appearance: He is well-developed. He is not diaphoretic. HENT:      Head: Normocephalic and atraumatic. Eyes:      Comments: Chronic changes   Cardiovascular:      Rate and Rhythm: Normal rate and regular rhythm. Heart sounds: No murmur heard. Pulmonary:      Effort: Pulmonary effort is normal. No respiratory distress. Breath sounds: Normal breath sounds. Comments: Distant but clear breath sounds  Abdominal:      General: Abdomen is flat. Palpations: Abdomen is soft. Musculoskeletal:         General: Normal range of motion. Skin:     General: Skin is warm and dry. Neurological:      Mental Status: He is alert and oriented to person, place, and time.    Psychiatric:         Behavior: Behavior normal.           Current Outpatient Medications   Medication Sig    predniSONE (DELTASONE) 10 mg tablet TAKE 1 TABLET BY MOUTH EVERY DAY WITH FOOD    furosemide (LASIX) 20 mg tablet TAKE 1 TABLET BY MOUTH EVERY DAY    famotidine (PEPCID) 20 mg tablet TAKE 1 TABLET BY MOUTH TWICE A DAY    dilTIAZem IR (CARDIZEM) 30 mg tablet Take 30 mg by mouth every six (6) hours as needed.  tamsulosin (FLOMAX) 0.4 mg capsule Take 0.4 mg by mouth daily.  fluticasone-umeclidinium-vilanterol (TRELEGY ELLIPTA) 100-62.5-25 mcg inhaler Take 1 Puff by inhalation daily.  Oxygen 2L continuous    finasteride (PROSCAR) 5 mg tablet TAKE 1 TABLET BY MOUTH EVERY DAY    albuterol (PROVENTIL VENTOLIN) 2.5 mg /3 mL (0.083 %) nebulizer solution USE 1 VIAL IN NEBULIZER EVERY 4 HOURS AS NEEDED    azithromycin (ZITHROMAX) 250 mg tablet Take one tablet on Monday, Wednesday and Friday    VIOS machine     montelukast (SINGULAIR) 10 mg tablet TAKE 1 TABLET BY MOUTH EVERY DAY    MUCINEX 600 mg ER tablet TAKE 1 TABLET BY MOUTH TWICE A DAY    albuterol-ipratropium (DUO-NEB) 2.5 mg-0.5 mg/3 ml nebu 3 mL by Nebulization route.  mometasone (NASONEX) 50 mcg/actuation nasal spray 2 Sprays by Both Nostrils route daily.  albuterol (VENTOLIN HFA) 90 mcg/actuation inhaler INHALE 2 PUFFS BY MOUTH EVERY 6 HOURS AS NEEDED FOR WHEEZING    pantoprazole (PROTONIX) 40 mg tablet Take 40 mg by mouth daily.  predniSONE (DELTASONE) 20 mg tablet TAKE 3 TABLETS BY MOUTH FOR 3 DAYS,2 TABLETS FOR 3 DAYS AND THEN 1 TABLET FOR 3 DAYS (Patient not taking: Reported on 8/19/2021)    amoxicillin-clavulanate (AUGMENTIN) 875-125 mg per tablet TAKE 1 TABLET BY MOUTH TWICE A DAY FOR 10 DAYS (Patient not taking: Reported on 8/19/2021)    predniSONE (DELTASONE) 5 mg tablet TAKE 1 TABLET BY MOUTH EVERY DAY. TAKE WITH FOOD (Patient not taking: Reported on 7/28/2021)     No current facility-administered medications for this visit. Past Medical History:   Diagnosis Date    Asthma     Chronic bronchitis (HCC)     Chronic obstructive pulmonary disease (HCC)     Diabetes ([facility]     no meds    GERD (gastroesophageal reflux disease)       Past Surgical History:   Procedure Laterality Date    HX CHOLECYSTECTOMY      DC CHEST SURGERY PROCEDURE UNLISTED      R upper lobe resection? due to absess      Social History     Tobacco Use    Smoking status: Former Smoker     Packs/day: 1.50     Years: 40.00     Pack years: 60.00     Quit date: 1979     Years since quittin.2    Smokeless tobacco: Never Used   Substance Use Topics    Alcohol use: No      Family History   Problem Relation Age of Onset    Heart Disease Father         Allergies   Allergen Reactions    Codeine Nausea and Vomiting        Assessment/Plan  Diagnoses and all orders for this visit:    1. Essential hypertension- stable    2. Pneumonia of left lung due to infectious organism, unspecified part of lung-lungs are clear today. Perceived shortness of breath recently. Suggest seeing Dr. Kranthi Vega again. Continue 3 times a week azithromycin as well as 10 mg of prednisone. No indication at this point to change antibiotics. 3. Chronic bronchitis, unspecified chronic bronchitis type (HCC)  -     azithromycin (ZITHROMAX) 250 mg tablet; Take one tablet on Monday, Wednesday and Friday    4. Gastroesophageal reflux disease without esophagitis-likely due to prednisone and antibiotic use. Will start daily pantoprazole and only use as needed famotidine.  -     pantoprazole (Protonix) 40 mg tablet; Take 1 Tablet by mouth daily. -     famotidine (PEPCID) 20 mg tablet; Take 1 Tablet by mouth two (2) times daily as needed for Heartburn. Nikhil Hylton MD  2021    This note was created with the help of speech recognition software [employer]) and may contain some 'sound alike' errors.

## 2021-08-19 NOTE — PROGRESS NOTES
Jose Ibanez  Identified pt with two pt identifiers(name and ). Chief Complaint   Patient presents with    Follow-up     RM18//pt is presenting today following up for pneumonia; having alot of heartburn       1. Have you been to the ER, urgent care clinic since your last visit? Hospitalized since your last visit? NO    2. Have you seen or consulted any other health care providers outside of the [address] [place] [address] since your last visit? Include any pap smears or colon screening. NO      Provider notified of reason for visit, vitals and flowsheets obtained on patients.      Patient received paperwork for advance directive during previous visit but has not completed at this time     Reviewed record In preparation for visit, huddled with provider and have obtained necessary documentation      Health Maintenance Due   Topic    COVID-19 Vaccine (1)    DTaP/Tdap/Td series (1 - Tdap)    Shingrix Vaccine Age 49> (1 of 2)    Medicare Yearly Exam        Wt Readings from Last 3 Encounters:   21 148 lb 9.6 oz (67.4 kg)   21 151 lb 3.2 oz (68.6 kg)   19 152 lb (68.9 kg)     Temp Readings from Last 3 Encounters:   21 98.2 °F (36.8 °C) (Oral)   21 98.7 °F (37.1 °C) (Oral)   21 97.4 °F (36.3 °C) (Oral)     BP Readings from Last 3 Encounters:   21 110/78   21 120/62   21 110/68     Pulse Readings from Last 3 Encounters:   21 95   21 76   21 81     Vitals:    21 1512   BP: 110/78   Pulse: 95   Resp: 16   Temp: 98.2 °F (36.8 °C)   TempSrc: Oral   SpO2: 99%   Height: 5' 9\" (1.753 m)         Learning Assessment:  :     Learning Assessment 2016   PRIMARY LEARNER Patient   PRIMARY LANGUAGE ENGLISH       Depression Screening:  :     3 most recent PHQ Screens 2021   Little interest or pleasure in doing things Not at all   Feeling down, depressed, irritable, or hopeless Not at all   Total Score PHQ 2 0       Fall Risk Assessment:  : Fall Risk Assessment, last 12 mths 7/14/2021   Able to walk? Yes   Fall in past 12 months? 0   Do you feel unsteady? 0   Are you worried about falling 0       Abuse Screening:  :     No flowsheet data found. ADL Screening:  :     ADL Assessment 4/25/2017   Feeding yourself No Help Needed   Getting from bed to chair No Help Needed   Getting dressed No Help Needed   Bathing or showering No Help Needed   Walk across the room (includes cane/walker) No Help Needed   Using the telphone No Help Needed   Taking your medications No Help Needed   Preparing meals No Help Needed   Managing money (expenses/bills) Help Needed   Moderately strenuous housework (laundry) Help Needed   Shopping for personal items (toiletries/medicines) Help Needed   Shopping for groceries Help Needed   Driving Help Needed   Climbing a flight of stairs No Help Needed   Getting to places beyond walking distances Help Needed         Medication reconciliation up to date and corrected with patient at this time.